# Patient Record
Sex: FEMALE | Race: WHITE | NOT HISPANIC OR LATINO
[De-identification: names, ages, dates, MRNs, and addresses within clinical notes are randomized per-mention and may not be internally consistent; named-entity substitution may affect disease eponyms.]

---

## 2017-01-11 ENCOUNTER — APPOINTMENT (OUTPATIENT)
Dept: INTERNAL MEDICINE | Facility: CLINIC | Age: 72
End: 2017-01-11

## 2017-01-11 VITALS
OXYGEN SATURATION: 94 % | TEMPERATURE: 97.6 F | HEART RATE: 85 BPM | RESPIRATION RATE: 13 BRPM | HEIGHT: 68 IN | DIASTOLIC BLOOD PRESSURE: 79 MMHG | SYSTOLIC BLOOD PRESSURE: 131 MMHG | BODY MASS INDEX: 31.07 KG/M2 | WEIGHT: 205 LBS

## 2017-01-11 DIAGNOSIS — J22 UNSPECIFIED ACUTE LOWER RESPIRATORY INFECTION: ICD-10-CM

## 2017-01-11 DIAGNOSIS — J44.1 CHRONIC OBSTRUCTIVE PULMONARY DISEASE WITH (ACUTE) EXACERBATION: ICD-10-CM

## 2017-05-03 ENCOUNTER — NON-APPOINTMENT (OUTPATIENT)
Age: 72
End: 2017-05-03

## 2017-05-03 ENCOUNTER — APPOINTMENT (OUTPATIENT)
Dept: INTERNAL MEDICINE | Facility: CLINIC | Age: 72
End: 2017-05-03

## 2017-05-03 VITALS
DIASTOLIC BLOOD PRESSURE: 73 MMHG | SYSTOLIC BLOOD PRESSURE: 119 MMHG | BODY MASS INDEX: 31.83 KG/M2 | RESPIRATION RATE: 12 BRPM | TEMPERATURE: 97.7 F | WEIGHT: 210 LBS | HEIGHT: 68 IN | OXYGEN SATURATION: 94 % | HEART RATE: 93 BPM

## 2017-05-03 RX ORDER — CEFUROXIME AXETIL 500 MG/1
500 TABLET ORAL
Qty: 20 | Refills: 0 | Status: DISCONTINUED | COMMUNITY
Start: 2017-01-11 | End: 2017-05-03

## 2017-05-04 LAB
ALBUMIN SERPL ELPH-MCNC: 4.3 G/DL
ALP BLD-CCNC: 61 U/L
ALT SERPL-CCNC: 12 U/L
ANION GAP SERPL CALC-SCNC: 17 MMOL/L
APPEARANCE: CLEAR
AST SERPL-CCNC: 19 U/L
BACTERIA: NEGATIVE
BILIRUB SERPL-MCNC: 0.5 MG/DL
BILIRUBIN URINE: ABNORMAL
BLOOD URINE: NEGATIVE
BUN SERPL-MCNC: 11 MG/DL
CALCIUM SERPL-MCNC: 9.7 MG/DL
CHLORIDE SERPL-SCNC: 97 MMOL/L
CHOLEST SERPL-MCNC: 189 MG/DL
CHOLEST/HDLC SERPL: 4.1 RATIO
CO2 SERPL-SCNC: 25 MMOL/L
COLOR: ABNORMAL
CREAT SERPL-MCNC: 1.06 MG/DL
CREAT SPEC-SCNC: 242 MG/DL
FERRITIN SERPL-MCNC: 71.7 NG/ML
FOLATE SERPL-MCNC: 12.4 NG/ML
GLUCOSE QUALITATIVE U: NORMAL MG/DL
GLUCOSE SERPL-MCNC: 92 MG/DL
HBA1C MFR BLD HPLC: 5.8 %
HDLC SERPL-MCNC: 46 MG/DL
HYALINE CASTS: 0 /LPF
KETONES URINE: ABNORMAL
LDLC SERPL CALC-MCNC: 112 MG/DL
LEUKOCYTE ESTERASE URINE: NEGATIVE
MICROALBUMIN 24H UR DL<=1MG/L-MCNC: 1.3 MG/DL
MICROALBUMIN/CREAT 24H UR-RTO: 5 UG/MG
MICROSCOPIC-UA: NORMAL
NITRITE URINE: NEGATIVE
PH URINE: 7
POTASSIUM SERPL-SCNC: 3.9 MMOL/L
PROT SERPL-MCNC: 7 G/DL
PROTEIN URINE: NEGATIVE MG/DL
RED BLOOD CELLS URINE: 2 /HPF
SODIUM SERPL-SCNC: 139 MMOL/L
SPECIFIC GRAVITY URINE: 1.02
SQUAMOUS EPITHELIAL CELLS: 3 /HPF
TRIGL SERPL-MCNC: 153 MG/DL
TSH SERPL-ACNC: 1.46 UIU/ML
URINE COMMENTS: NORMAL
UROBILINOGEN URINE: 1 MG/DL
VIT B12 SERPL-MCNC: 276 PG/ML
WHITE BLOOD CELLS URINE: 2 /HPF

## 2017-05-05 LAB
BASOPHILS # BLD AUTO: 0.04 K/UL
BASOPHILS NFR BLD AUTO: 0.7 %
EOSINOPHIL # BLD AUTO: 0.07 K/UL
EOSINOPHIL NFR BLD AUTO: 1.3 %
HCT VFR BLD CALC: 41.9 %
HGB BLD-MCNC: 13.9 G/DL
IMM GRANULOCYTES NFR BLD AUTO: 0.2 %
LYMPHOCYTES # BLD AUTO: 2.31 K/UL
LYMPHOCYTES NFR BLD AUTO: 42.2 %
MAN DIFF?: NORMAL
MCHC RBC-ENTMCNC: 28.8 PG
MCHC RBC-ENTMCNC: 33.2 GM/DL
MCV RBC AUTO: 86.7 FL
MONOCYTES # BLD AUTO: 0.52 K/UL
MONOCYTES NFR BLD AUTO: 9.5 %
NEUTROPHILS # BLD AUTO: 2.53 K/UL
NEUTROPHILS NFR BLD AUTO: 46.1 %
PLATELET # BLD AUTO: 204 K/UL
RBC # BLD: 4.83 M/UL
RBC # FLD: 13.6 %
WBC # FLD AUTO: 5.48 K/UL

## 2017-06-30 DIAGNOSIS — R92.2 INCONCLUSIVE MAMMOGRAM: ICD-10-CM

## 2017-08-14 ENCOUNTER — APPOINTMENT (OUTPATIENT)
Dept: INTERNAL MEDICINE | Facility: CLINIC | Age: 72
End: 2017-08-14
Payer: MEDICARE

## 2017-08-14 VITALS
OXYGEN SATURATION: 97 % | HEART RATE: 80 BPM | HEIGHT: 68 IN | BODY MASS INDEX: 31.37 KG/M2 | DIASTOLIC BLOOD PRESSURE: 73 MMHG | TEMPERATURE: 98.2 F | RESPIRATION RATE: 12 BRPM | SYSTOLIC BLOOD PRESSURE: 123 MMHG | WEIGHT: 207 LBS

## 2017-08-14 DIAGNOSIS — R20.8 OTHER DISTURBANCES OF SKIN SENSATION: ICD-10-CM

## 2017-08-14 DIAGNOSIS — R92.8 OTHER ABNORMAL AND INCONCLUSIVE FINDINGS ON DIAGNOSTIC IMAGING OF BREAST: ICD-10-CM

## 2017-08-14 DIAGNOSIS — R73.03 PREDIABETES.: ICD-10-CM

## 2017-08-14 PROCEDURE — 99214 OFFICE O/P EST MOD 30 MIN: CPT

## 2017-08-14 RX ORDER — FAMOTIDINE 20 MG/1
20 TABLET, FILM COATED ORAL
Qty: 15 | Refills: 0 | Status: DISCONTINUED | COMMUNITY
Start: 2017-01-11 | End: 2017-08-14

## 2017-08-15 LAB
25(OH)D3 SERPL-MCNC: 30.7 NG/ML
ANION GAP SERPL CALC-SCNC: 14 MMOL/L
APPEARANCE: CLEAR
BACTERIA: ABNORMAL
BILIRUBIN URINE: ABNORMAL
BLOOD URINE: NEGATIVE
BUN SERPL-MCNC: 10 MG/DL
CALCIUM SERPL-MCNC: 8.9 MG/DL
CHLORIDE SERPL-SCNC: 102 MMOL/L
CO2 SERPL-SCNC: 27 MMOL/L
COLOR: ABNORMAL
CREAT SERPL-MCNC: 0.87 MG/DL
CREAT SPEC-SCNC: 249 MG/DL
FERRITIN SERPL-MCNC: 59 NG/ML
FOLATE SERPL-MCNC: 7.4 NG/ML
GLUCOSE QUALITATIVE U: NORMAL MG/DL
GLUCOSE SERPL-MCNC: 108 MG/DL
HBA1C MFR BLD HPLC: 5.5 %
HYALINE CASTS: 9 /LPF
KETONES URINE: ABNORMAL
LEUKOCYTE ESTERASE URINE: NEGATIVE
MICROALBUMIN 24H UR DL<=1MG/L-MCNC: 3.2 MG/DL
MICROALBUMIN/CREAT 24H UR-RTO: 13 MG/G
MICROSCOPIC-UA: NORMAL
NITRITE URINE: NEGATIVE
PH URINE: 7.5
POTASSIUM SERPL-SCNC: 3.9 MMOL/L
PROTEIN URINE: ABNORMAL MG/DL
RED BLOOD CELLS URINE: 4 /HPF
SODIUM SERPL-SCNC: 143 MMOL/L
SPECIFIC GRAVITY URINE: 1.02
SQUAMOUS EPITHELIAL CELLS: 2 /HPF
TSH SERPL-ACNC: 1.57 UIU/ML
URATE SERPL-MCNC: 6.3 MG/DL
UROBILINOGEN URINE: 1 MG/DL
VIT B12 SERPL-MCNC: 269 PG/ML
WHITE BLOOD CELLS URINE: 3 /HPF

## 2017-08-16 LAB
BASOPHILS # BLD AUTO: 0.03 K/UL
BASOPHILS NFR BLD AUTO: 0.6 %
EOSINOPHIL # BLD AUTO: 0.06 K/UL
EOSINOPHIL NFR BLD AUTO: 1.1 %
HCT VFR BLD CALC: 43.9 %
HGB BLD-MCNC: 14 G/DL
IMM GRANULOCYTES NFR BLD AUTO: 0.2 %
LYMPHOCYTES # BLD AUTO: 1.99 K/UL
LYMPHOCYTES NFR BLD AUTO: 36.8 %
MAN DIFF?: NORMAL
MCHC RBC-ENTMCNC: 28.6 PG
MCHC RBC-ENTMCNC: 31.9 GM/DL
MCV RBC AUTO: 89.8 FL
MONOCYTES # BLD AUTO: 0.45 K/UL
MONOCYTES NFR BLD AUTO: 8.3 %
NEUTROPHILS # BLD AUTO: 2.87 K/UL
NEUTROPHILS NFR BLD AUTO: 53 %
PLATELET # BLD AUTO: 198 K/UL
RBC # BLD: 4.89 M/UL
RBC # FLD: 14.7 %
WBC # FLD AUTO: 5.41 K/UL

## 2018-05-04 ENCOUNTER — NON-APPOINTMENT (OUTPATIENT)
Age: 73
End: 2018-05-04

## 2018-05-04 ENCOUNTER — LABORATORY RESULT (OUTPATIENT)
Age: 73
End: 2018-05-04

## 2018-05-04 ENCOUNTER — APPOINTMENT (OUTPATIENT)
Dept: INTERNAL MEDICINE | Facility: CLINIC | Age: 73
End: 2018-05-04
Payer: MEDICARE

## 2018-05-04 VITALS
HEIGHT: 68 IN | WEIGHT: 203 LBS | BODY MASS INDEX: 30.77 KG/M2 | HEART RATE: 86 BPM | TEMPERATURE: 98.8 F | SYSTOLIC BLOOD PRESSURE: 135 MMHG | DIASTOLIC BLOOD PRESSURE: 71 MMHG | RESPIRATION RATE: 12 BRPM | OXYGEN SATURATION: 93 %

## 2018-05-04 DIAGNOSIS — M16.10 UNILATERAL PRIMARY OSTEOARTHRITIS, UNSPECIFIED HIP: ICD-10-CM

## 2018-05-04 PROCEDURE — 93000 ELECTROCARDIOGRAM COMPLETE: CPT

## 2018-05-04 PROCEDURE — 99213 OFFICE O/P EST LOW 20 MIN: CPT

## 2018-05-04 RX ORDER — PROMETHAZINE HYDROCHLORIDE AND DEXTROMETHORPHAN HYDROBROMIDE ORAL SOLUTION 15; 6.25 MG/5ML; MG/5ML
6.25-15 SOLUTION ORAL
Qty: 60 | Refills: 0 | Status: DISCONTINUED | COMMUNITY
Start: 2018-04-09

## 2018-05-04 RX ORDER — PREDNISONE 20 MG/1
20 TABLET ORAL
Qty: 7 | Refills: 0 | Status: DISCONTINUED | COMMUNITY
Start: 2018-04-09

## 2018-05-04 RX ORDER — BUDESONIDE 90 UG/1
90 AEROSOL, POWDER RESPIRATORY (INHALATION) TWICE DAILY
Qty: 1 | Refills: 3 | Status: DISCONTINUED | COMMUNITY
Start: 2017-01-11 | End: 2018-05-04

## 2018-05-04 RX ORDER — DOXYCYCLINE HYCLATE 100 MG/1
100 TABLET ORAL
Qty: 20 | Refills: 0 | Status: DISCONTINUED | COMMUNITY
Start: 2018-04-09

## 2018-05-04 RX ORDER — FLUTICASONE PROPIONATE 50 UG/1
50 SPRAY, METERED NASAL TWICE DAILY
Qty: 1 | Refills: 0 | Status: DISCONTINUED | COMMUNITY
Start: 2017-01-11 | End: 2018-05-04

## 2018-05-04 RX ORDER — ALBUTEROL SULFATE 90 UG/1
108 (90 BASE) AEROSOL, METERED RESPIRATORY (INHALATION)
Qty: 1 | Refills: 1 | Status: DISCONTINUED | COMMUNITY
Start: 2017-01-11 | End: 2018-05-04

## 2018-05-04 RX ORDER — BENZONATATE 100 MG/1
100 CAPSULE ORAL
Qty: 30 | Refills: 0 | Status: DISCONTINUED | COMMUNITY
Start: 2018-03-31

## 2018-05-07 DIAGNOSIS — N39.0 URINARY TRACT INFECTION, SITE NOT SPECIFIED: ICD-10-CM

## 2018-05-07 LAB
25(OH)D3 SERPL-MCNC: 26.9 NG/ML
ALBUMIN SERPL ELPH-MCNC: 4.1 G/DL
ALP BLD-CCNC: 51 U/L
ALT SERPL-CCNC: 16 U/L
ANION GAP SERPL CALC-SCNC: 20 MMOL/L
APPEARANCE: ABNORMAL
AST SERPL-CCNC: 20 U/L
BASOPHILS # BLD AUTO: 0.04 K/UL
BASOPHILS NFR BLD AUTO: 0.9 %
BILIRUB SERPL-MCNC: 0.6 MG/DL
BILIRUBIN URINE: ABNORMAL
BLOOD URINE: NEGATIVE
BUN SERPL-MCNC: 13 MG/DL
CALCIUM SERPL-MCNC: 9.6 MG/DL
CHLORIDE SERPL-SCNC: 102 MMOL/L
CHOLEST SERPL-MCNC: 217 MG/DL
CHOLEST/HDLC SERPL: 4.1 RATIO
CO2 SERPL-SCNC: 23 MMOL/L
COLOR: ABNORMAL
CREAT SERPL-MCNC: 1.12 MG/DL
CREAT SPEC-SCNC: 571 MG/DL
EOSINOPHIL # BLD AUTO: 0.06 K/UL
EOSINOPHIL NFR BLD AUTO: 1.3 %
FERRITIN SERPL-MCNC: 117 NG/ML
FOLATE SERPL-MCNC: 9.6 NG/ML
GLUCOSE QUALITATIVE U: NEGATIVE MG/DL
GLUCOSE SERPL-MCNC: 116 MG/DL
HBA1C MFR BLD HPLC: 5.7 %
HCT VFR BLD CALC: 39.7 %
HDLC SERPL-MCNC: 53 MG/DL
HGB BLD-MCNC: 13.4 G/DL
IMM GRANULOCYTES NFR BLD AUTO: 0 %
KETONES URINE: ABNORMAL
LDLC SERPL CALC-MCNC: 139 MG/DL
LEUKOCYTE ESTERASE URINE: NEGATIVE
LYMPHOCYTES # BLD AUTO: 1.95 K/UL
LYMPHOCYTES NFR BLD AUTO: 42.8 %
MAN DIFF?: NORMAL
MCHC RBC-ENTMCNC: 29 PG
MCHC RBC-ENTMCNC: 33.8 GM/DL
MCV RBC AUTO: 85.9 FL
MICROALBUMIN 24H UR DL<=1MG/L-MCNC: 8 MG/DL
MICROALBUMIN/CREAT 24H UR-RTO: 14 MG/G
MONOCYTES # BLD AUTO: 0.46 K/UL
MONOCYTES NFR BLD AUTO: 10.1 %
NEUTROPHILS # BLD AUTO: 2.05 K/UL
NEUTROPHILS NFR BLD AUTO: 44.9 %
NITRITE URINE: NEGATIVE
PH URINE: 6
PLATELET # BLD AUTO: 204 K/UL
POTASSIUM SERPL-SCNC: 3.7 MMOL/L
PROT SERPL-MCNC: 6.8 G/DL
PROTEIN URINE: 30 MG/DL
RBC # BLD: 4.62 M/UL
RBC # FLD: 14.4 %
SODIUM SERPL-SCNC: 145 MMOL/L
SPECIFIC GRAVITY URINE: 1.03
TRIGL SERPL-MCNC: 123 MG/DL
TSH SERPL-ACNC: 2.22 UIU/ML
URATE SERPL-MCNC: 7.4 MG/DL
UROBILINOGEN URINE: 1 MG/DL
VIT B12 SERPL-MCNC: 317 PG/ML
WBC # FLD AUTO: 4.56 K/UL

## 2018-06-11 ENCOUNTER — APPOINTMENT (OUTPATIENT)
Dept: INTERNAL MEDICINE | Facility: CLINIC | Age: 73
End: 2018-06-11
Payer: MEDICARE

## 2018-06-11 VITALS
RESPIRATION RATE: 12 BRPM | SYSTOLIC BLOOD PRESSURE: 115 MMHG | WEIGHT: 202 LBS | DIASTOLIC BLOOD PRESSURE: 67 MMHG | HEART RATE: 87 BPM | OXYGEN SATURATION: 95 % | TEMPERATURE: 98 F | HEIGHT: 68 IN | BODY MASS INDEX: 30.62 KG/M2

## 2018-06-11 PROCEDURE — 99214 OFFICE O/P EST MOD 30 MIN: CPT

## 2018-06-11 RX ORDER — SULFAMETHOXAZOLE AND TRIMETHOPRIM 800; 160 MG/1; MG/1
800-160 TABLET ORAL TWICE DAILY
Qty: 10 | Refills: 0 | Status: DISCONTINUED | COMMUNITY
Start: 2018-05-07 | End: 2018-06-11

## 2018-06-11 RX ORDER — SIMVASTATIN 5 MG/1
5 TABLET, FILM COATED ORAL
Qty: 30 | Refills: 3 | Status: COMPLETED | COMMUNITY
Start: 2018-05-07 | End: 2018-06-11

## 2018-06-13 DIAGNOSIS — E87.6 HYPOKALEMIA: ICD-10-CM

## 2018-06-13 LAB
ALBUMIN SERPL ELPH-MCNC: 4.2 G/DL
ALP BLD-CCNC: 48 U/L
ALT SERPL-CCNC: 13 U/L
ANION GAP SERPL CALC-SCNC: 16 MMOL/L
AST SERPL-CCNC: 23 U/L
BACTERIA UR CULT: NORMAL
BILIRUB SERPL-MCNC: 0.7 MG/DL
BUN SERPL-MCNC: 15 MG/DL
CALCIUM SERPL-MCNC: 9.6 MG/DL
CHLORIDE SERPL-SCNC: 97 MMOL/L
CHOLEST SERPL-MCNC: 179 MG/DL
CHOLEST/HDLC SERPL: 3.3 RATIO
CO2 SERPL-SCNC: 27 MMOL/L
CREAT SERPL-MCNC: 1.02 MG/DL
GLUCOSE SERPL-MCNC: 90 MG/DL
HDLC SERPL-MCNC: 54 MG/DL
LDLC SERPL CALC-MCNC: 104 MG/DL
POTASSIUM SERPL-SCNC: 3.4 MMOL/L
PROT SERPL-MCNC: 7.6 G/DL
SODIUM SERPL-SCNC: 140 MMOL/L
TRIGL SERPL-MCNC: 103 MG/DL

## 2018-06-18 LAB
APPEARANCE: CLEAR
BACTERIA: NEGATIVE
BILIRUBIN URINE: NEGATIVE
BLOOD URINE: NEGATIVE
COLOR: ABNORMAL
GLUCOSE QUALITATIVE U: NEGATIVE MG/DL
HYALINE CASTS: 4 /LPF
KETONES URINE: NEGATIVE
LEUKOCYTE ESTERASE URINE: NEGATIVE
MICROSCOPIC-UA: NORMAL
NITRITE URINE: NEGATIVE
PH URINE: 7
PROTEIN URINE: NEGATIVE MG/DL
RED BLOOD CELLS URINE: 1 /HPF
SPECIFIC GRAVITY URINE: 1.02
SQUAMOUS EPITHELIAL CELLS: 2 /HPF
UROBILINOGEN URINE: NEGATIVE MG/DL
WHITE BLOOD CELLS URINE: 4 /HPF

## 2018-11-01 ENCOUNTER — APPOINTMENT (OUTPATIENT)
Dept: INTERNAL MEDICINE | Facility: CLINIC | Age: 73
End: 2018-11-01
Payer: MEDICARE

## 2018-11-01 VITALS
HEIGHT: 68 IN | WEIGHT: 199 LBS | DIASTOLIC BLOOD PRESSURE: 72 MMHG | HEART RATE: 75 BPM | RESPIRATION RATE: 12 BRPM | BODY MASS INDEX: 30.16 KG/M2 | TEMPERATURE: 98.5 F | OXYGEN SATURATION: 95 % | SYSTOLIC BLOOD PRESSURE: 121 MMHG

## 2018-11-01 DIAGNOSIS — J44.9 CHRONIC OBSTRUCTIVE PULMONARY DISEASE, UNSPECIFIED: ICD-10-CM

## 2018-11-01 PROCEDURE — 99214 OFFICE O/P EST MOD 30 MIN: CPT

## 2018-11-01 NOTE — REVIEW OF SYSTEMS
[Joint Pain] : joint pain [Joint Stiffness] : joint stiffness [Unsteady Walking] : ataxia [Negative] : Heme/Lymph [Joint Swelling] : no joint swelling [Muscle Pain] : no muscle pain [Back Pain] : no back pain [Headache] : no headache [Dizziness] : no dizziness [Confusion] : no confusion [Memory Loss] : no memory loss

## 2018-11-01 NOTE — PHYSICAL EXAM

## 2019-07-19 ENCOUNTER — LABORATORY RESULT (OUTPATIENT)
Age: 74
End: 2019-07-19

## 2019-07-19 ENCOUNTER — APPOINTMENT (OUTPATIENT)
Dept: INTERNAL MEDICINE | Facility: CLINIC | Age: 74
End: 2019-07-19
Payer: MEDICARE

## 2019-07-19 ENCOUNTER — NON-APPOINTMENT (OUTPATIENT)
Age: 74
End: 2019-07-19

## 2019-07-19 VITALS
HEART RATE: 96 BPM | OXYGEN SATURATION: 99 % | HEIGHT: 68 IN | DIASTOLIC BLOOD PRESSURE: 90 MMHG | TEMPERATURE: 98.4 F | WEIGHT: 200 LBS | BODY MASS INDEX: 30.31 KG/M2 | SYSTOLIC BLOOD PRESSURE: 132 MMHG | RESPIRATION RATE: 12 BRPM

## 2019-07-19 VITALS — DIASTOLIC BLOOD PRESSURE: 80 MMHG | SYSTOLIC BLOOD PRESSURE: 125 MMHG

## 2019-07-19 DIAGNOSIS — J44.9 CHRONIC OBSTRUCTIVE PULMONARY DISEASE, UNSPECIFIED: ICD-10-CM

## 2019-07-19 PROCEDURE — 93000 ELECTROCARDIOGRAM COMPLETE: CPT

## 2019-07-19 PROCEDURE — G0439: CPT | Mod: 25

## 2019-07-19 NOTE — REVIEW OF SYSTEMS
[Joint Pain] : joint pain [Joint Stiffness] : joint stiffness [Negative] : Heme/Lymph [Joint Swelling] : no joint swelling [Muscle Pain] : no muscle pain [Back Pain] : no back pain [FreeTextEntry9] : LEFT HIP

## 2019-07-19 NOTE — COUNSELING
[Weight management counseling provided] : Weight management [Healthy eating counseling provided] : healthy eating [Activity counseling provided] : activity [Fall prevention counseling provided] : fall prevention

## 2019-07-19 NOTE — PHYSICAL EXAM
[No Acute Distress] : no acute distress [Well Nourished] : well nourished [Well Developed] : well developed [Well-Appearing] : well-appearing [Normal Sclera/Conjunctiva] : normal sclera/conjunctiva [PERRL] : pupils equal round and reactive to light [EOMI] : extraocular movements intact [Normal Outer Ear/Nose] : the outer ears and nose were normal in appearance [Normal Oropharynx] : the oropharynx was normal [No JVD] : no jugular venous distention [No Lymphadenopathy] : no lymphadenopathy [Supple] : supple [Thyroid Normal, No Nodules] : the thyroid was normal and there were no nodules present [No Respiratory Distress] : no respiratory distress  [No Accessory Muscle Use] : no accessory muscle use [Clear to Auscultation] : lungs were clear to auscultation bilaterally [Normal Rate] : normal rate  [Regular Rhythm] : with a regular rhythm [Normal S1, S2] : normal S1 and S2 [No Murmur] : no murmur heard [No Carotid Bruits] : no carotid bruits [No Abdominal Bruit] : a ~M bruit was not heard ~T in the abdomen [No Varicosities] : no varicosities [Pedal Pulses Present] : the pedal pulses are present [No Edema] : there was no peripheral edema [No Palpable Aorta] : no palpable aorta [No Extremity Clubbing/Cyanosis] : no extremity clubbing/cyanosis [Normal Appearance] : normal in appearance [No Nipple Discharge] : no nipple discharge [No Axillary Lymphadenopathy] : no axillary lymphadenopathy [Soft] : abdomen soft [Non Tender] : non-tender [Non-distended] : non-distended [No Masses] : no abdominal mass palpated [No HSM] : no HSM [Normal Bowel Sounds] : normal bowel sounds [Normal Posterior Cervical Nodes] : no posterior cervical lymphadenopathy [Normal Anterior Cervical Nodes] : no anterior cervical lymphadenopathy [No CVA Tenderness] : no CVA  tenderness [No Spinal Tenderness] : no spinal tenderness [No Joint Swelling] : no joint swelling [Grossly Normal Strength/Tone] : grossly normal strength/tone [No Rash] : no rash [Coordination Grossly Intact] : coordination grossly intact [No Focal Deficits] : no focal deficits [Normal Gait] : normal gait [Deep Tendon Reflexes (DTR)] : deep tendon reflexes were 2+ and symmetric [Normal Affect] : the affect was normal [Normal Insight/Judgement] : insight and judgment were intact [de-identified] : LIMITED ROM OF LEFT HIP

## 2019-07-22 LAB
25(OH)D3 SERPL-MCNC: 29.5 NG/ML
ALBUMIN SERPL ELPH-MCNC: 4 G/DL
ALP BLD-CCNC: 57 U/L
ALT SERPL-CCNC: 11 U/L
ANION GAP SERPL CALC-SCNC: 13 MMOL/L
APPEARANCE: CLEAR
AST SERPL-CCNC: 10 U/L
BASOPHILS # BLD AUTO: 0.05 K/UL
BASOPHILS NFR BLD AUTO: 1 %
BILIRUB SERPL-MCNC: 0.7 MG/DL
BILIRUBIN URINE: NEGATIVE
BLOOD URINE: NEGATIVE
BUN SERPL-MCNC: 12 MG/DL
CALCIUM SERPL-MCNC: 9.2 MG/DL
CHLORIDE SERPL-SCNC: 100 MMOL/L
CO2 SERPL-SCNC: 26 MMOL/L
COLOR: YELLOW
CREAT SERPL-MCNC: 0.96 MG/DL
CREAT SPEC-SCNC: 243 MG/DL
EOSINOPHIL # BLD AUTO: 0.07 K/UL
EOSINOPHIL NFR BLD AUTO: 1.5 %
ESTIMATED AVERAGE GLUCOSE: 108 MG/DL
FERRITIN SERPL-MCNC: 80 NG/ML
FOLATE SERPL-MCNC: 7.1 NG/ML
GLUCOSE QUALITATIVE U: NEGATIVE
GLUCOSE SERPL-MCNC: 101 MG/DL
GLUCOSE SERPL-MCNC: 101 MG/DL
HBA1C MFR BLD HPLC: 5.4 %
HCT VFR BLD CALC: 41.5 %
HGB BLD-MCNC: 13.5 G/DL
IMM GRANULOCYTES NFR BLD AUTO: 0.2 %
KETONES URINE: NEGATIVE
LEUKOCYTE ESTERASE URINE: NEGATIVE
LYMPHOCYTES # BLD AUTO: 1.88 K/UL
LYMPHOCYTES NFR BLD AUTO: 39.2 %
MAN DIFF?: NORMAL
MCHC RBC-ENTMCNC: 28.1 PG
MCHC RBC-ENTMCNC: 32.5 GM/DL
MCV RBC AUTO: 86.5 FL
MICROALBUMIN 24H UR DL<=1MG/L-MCNC: 1.3 MG/DL
MICROALBUMIN/CREAT 24H UR-RTO: 5 MG/G
MONOCYTES # BLD AUTO: 0.45 K/UL
MONOCYTES NFR BLD AUTO: 9.4 %
NEUTROPHILS # BLD AUTO: 2.34 K/UL
NEUTROPHILS NFR BLD AUTO: 48.7 %
NITRITE URINE: NEGATIVE
PH URINE: 6.5
PLATELET # BLD AUTO: 173 K/UL
POTASSIUM SERPL-SCNC: 3.6 MMOL/L
PROT SERPL-MCNC: 6.6 G/DL
PROTEIN URINE: ABNORMAL
RBC # BLD: 4.8 M/UL
RBC # FLD: 12.9 %
SODIUM SERPL-SCNC: 139 MMOL/L
SPECIFIC GRAVITY URINE: 1.02
TSH SERPL-ACNC: 2.05 UIU/ML
UROBILINOGEN URINE: NORMAL
VIT B12 SERPL-MCNC: 236 PG/ML
WBC # FLD AUTO: 4.8 K/UL

## 2019-07-24 LAB
CHOLEST SERPL-MCNC: 177 MG/DL
CHOLEST/HDLC SERPL: 4 RATIO
HDLC SERPL-MCNC: 44 MG/DL
LDLC SERPL CALC-MCNC: 112 MG/DL
TRIGL SERPL-MCNC: 107 MG/DL

## 2020-01-17 ENCOUNTER — APPOINTMENT (OUTPATIENT)
Dept: INTERNAL MEDICINE | Facility: CLINIC | Age: 75
End: 2020-01-17
Payer: MEDICARE

## 2020-01-17 VITALS
HEART RATE: 71 BPM | SYSTOLIC BLOOD PRESSURE: 124 MMHG | WEIGHT: 199 LBS | BODY MASS INDEX: 30.16 KG/M2 | TEMPERATURE: 98 F | DIASTOLIC BLOOD PRESSURE: 80 MMHG | HEIGHT: 68 IN | OXYGEN SATURATION: 98 % | RESPIRATION RATE: 12 BRPM

## 2020-01-17 DIAGNOSIS — Z00.00 ENCOUNTER FOR GENERAL ADULT MEDICAL EXAMINATION W/OUT ABNORMAL FINDINGS: ICD-10-CM

## 2020-01-17 PROCEDURE — 90662 IIV NO PRSV INCREASED AG IM: CPT

## 2020-01-17 PROCEDURE — 99213 OFFICE O/P EST LOW 20 MIN: CPT | Mod: 25

## 2020-01-17 PROCEDURE — G0008: CPT

## 2020-01-17 RX ORDER — PNEUMOCOCCAL 13-VALENT CONJUGATE VACCINE 2.2; 2.2; 2.2; 2.2; 2.2; 4.4; 2.2; 2.2; 2.2; 2.2; 2.2; 2.2; 2.2 UG/.5ML; UG/.5ML; UG/.5ML; UG/.5ML; UG/.5ML; UG/.5ML; UG/.5ML; UG/.5ML; UG/.5ML; UG/.5ML; UG/.5ML; UG/.5ML; UG/.5ML
INJECTION, SUSPENSION INTRAMUSCULAR
Qty: 1 | Refills: 0 | Status: DISCONTINUED | COMMUNITY
Start: 2018-11-01 | End: 2020-01-17

## 2020-01-17 RX ORDER — ZOSTER VACCINE RECOMBINANT, ADJUVANTED 50 MCG/0.5
50 KIT INTRAMUSCULAR
Qty: 1 | Refills: 1 | Status: DISCONTINUED | COMMUNITY
Start: 2019-07-19 | End: 2020-01-17

## 2020-01-17 NOTE — PHYSICAL EXAM
[No Acute Distress] : no acute distress [Well Nourished] : well nourished [Well-Appearing] : well-appearing [Well Developed] : well developed [Normal Sclera/Conjunctiva] : normal sclera/conjunctiva [EOMI] : extraocular movements intact [PERRL] : pupils equal round and reactive to light [Normal Outer Ear/Nose] : the outer ears and nose were normal in appearance [No JVD] : no jugular venous distention [Normal Oropharynx] : the oropharynx was normal [Supple] : supple [No Lymphadenopathy] : no lymphadenopathy [Thyroid Normal, No Nodules] : the thyroid was normal and there were no nodules present [No Accessory Muscle Use] : no accessory muscle use [No Respiratory Distress] : no respiratory distress  [Regular Rhythm] : with a regular rhythm [Normal S1, S2] : normal S1 and S2 [Clear to Auscultation] : lungs were clear to auscultation bilaterally [Normal Rate] : normal rate  [No Murmur] : no murmur heard [No Abdominal Bruit] : a ~M bruit was not heard ~T in the abdomen [No Carotid Bruits] : no carotid bruits [No Varicosities] : no varicosities [Pedal Pulses Present] : the pedal pulses are present [No Edema] : there was no peripheral edema [No Palpable Aorta] : no palpable aorta [No Extremity Clubbing/Cyanosis] : no extremity clubbing/cyanosis [Non Tender] : non-tender [Soft] : abdomen soft [Non-distended] : non-distended [No HSM] : no HSM [No Masses] : no abdominal mass palpated [Normal Bowel Sounds] : normal bowel sounds [Normal Anterior Cervical Nodes] : no anterior cervical lymphadenopathy [No CVA Tenderness] : no CVA  tenderness [Normal Posterior Cervical Nodes] : no posterior cervical lymphadenopathy [Grossly Normal Strength/Tone] : grossly normal strength/tone [No Spinal Tenderness] : no spinal tenderness [No Joint Swelling] : no joint swelling [No Focal Deficits] : no focal deficits [No Rash] : no rash [Coordination Grossly Intact] : coordination grossly intact [Normal Affect] : the affect was normal [Normal Gait] : normal gait [Deep Tendon Reflexes (DTR)] : deep tendon reflexes were 2+ and symmetric [Normal Insight/Judgement] : insight and judgment were intact

## 2020-09-08 ENCOUNTER — APPOINTMENT (OUTPATIENT)
Dept: INTERNAL MEDICINE | Facility: CLINIC | Age: 75
End: 2020-09-08

## 2020-10-13 ENCOUNTER — APPOINTMENT (OUTPATIENT)
Dept: INTERNAL MEDICINE | Facility: CLINIC | Age: 75
End: 2020-10-13
Payer: MEDICARE

## 2020-10-13 VITALS
OXYGEN SATURATION: 93 % | DIASTOLIC BLOOD PRESSURE: 71 MMHG | WEIGHT: 176 LBS | HEIGHT: 68 IN | BODY MASS INDEX: 26.67 KG/M2 | HEART RATE: 85 BPM | TEMPERATURE: 97.3 F | SYSTOLIC BLOOD PRESSURE: 119 MMHG

## 2020-10-13 DIAGNOSIS — S72.001A FRACTURE OF UNSPECIFIED PART OF NECK OF RIGHT FEMUR, INITIAL ENCOUNTER FOR CLOSED FRACTURE: ICD-10-CM

## 2020-10-13 DIAGNOSIS — R11.0 NAUSEA: ICD-10-CM

## 2020-10-13 PROCEDURE — 99214 OFFICE O/P EST MOD 30 MIN: CPT

## 2020-10-13 NOTE — PHYSICAL EXAM
[No Acute Distress] : no acute distress [Well Nourished] : well nourished [Well Developed] : well developed [Well-Appearing] : well-appearing [Normal Sclera/Conjunctiva] : normal sclera/conjunctiva [PERRL] : pupils equal round and reactive to light [EOMI] : extraocular movements intact [Normal Outer Ear/Nose] : the outer ears and nose were normal in appearance [Normal Oropharynx] : the oropharynx was normal [No JVD] : no jugular venous distention [No Lymphadenopathy] : no lymphadenopathy [Supple] : supple [Thyroid Normal, No Nodules] : the thyroid was normal and there were no nodules present [No Respiratory Distress] : no respiratory distress  [No Accessory Muscle Use] : no accessory muscle use [Clear to Auscultation] : lungs were clear to auscultation bilaterally [Normal Rate] : normal rate  [Regular Rhythm] : with a regular rhythm [Normal S1, S2] : normal S1 and S2 [No Murmur] : no murmur heard [No Carotid Bruits] : no carotid bruits [No Abdominal Bruit] : a ~M bruit was not heard ~T in the abdomen [No Varicosities] : no varicosities [Pedal Pulses Present] : the pedal pulses are present [No Edema] : there was no peripheral edema [No Palpable Aorta] : no palpable aorta [No Extremity Clubbing/Cyanosis] : no extremity clubbing/cyanosis [Soft] : abdomen soft [Non Tender] : non-tender [Non-distended] : non-distended [No Masses] : no abdominal mass palpated [No HSM] : no HSM [Normal Bowel Sounds] : normal bowel sounds [Normal Posterior Cervical Nodes] : no posterior cervical lymphadenopathy [Normal Anterior Cervical Nodes] : no anterior cervical lymphadenopathy [No CVA Tenderness] : no CVA  tenderness [No Spinal Tenderness] : no spinal tenderness [No Joint Swelling] : no joint swelling [Grossly Normal Strength/Tone] : grossly normal strength/tone [No Rash] : no rash [Coordination Grossly Intact] : coordination grossly intact [No Focal Deficits] : no focal deficits [Normal Gait] : normal gait [Deep Tendon Reflexes (DTR)] : deep tendon reflexes were 2+ and symmetric [Normal Affect] : the affect was normal [Normal Insight/Judgement] : insight and judgment were intact [de-identified] : DECREASE ROM AND TENDER RIGHT HIP, 4/5 MS

## 2020-10-13 NOTE — REVIEW OF SYSTEMS
[Joint Pain] : joint pain [Dizziness] : dizziness [Memory Loss] : memory loss [Unsteady Walking] : ataxia [Negative] : Heme/Lymph [Joint Stiffness] : no joint stiffness [Joint Swelling] : no joint swelling [Muscle Weakness] : no muscle weakness [Muscle Pain] : no muscle pain [Back Pain] : no back pain [Headache] : no headache [Fainting] : no fainting [Confusion] : no confusion [FreeTextEntry9] : RIGHT HIP

## 2020-10-21 DIAGNOSIS — I63.312 CEREBRAL INFARCTION DUE TO THROMBOSIS OF LEFT MIDDLE CEREBRAL ARTERY: ICD-10-CM

## 2020-11-02 ENCOUNTER — APPOINTMENT (OUTPATIENT)
Dept: CARDIOLOGY | Facility: CLINIC | Age: 75
End: 2020-11-02
Payer: MEDICARE

## 2020-11-02 ENCOUNTER — NON-APPOINTMENT (OUTPATIENT)
Age: 75
End: 2020-11-02

## 2020-11-02 ENCOUNTER — APPOINTMENT (OUTPATIENT)
Dept: INTERNAL MEDICINE | Facility: CLINIC | Age: 75
End: 2020-11-02
Payer: MEDICARE

## 2020-11-02 VITALS
BODY MASS INDEX: 26.22 KG/M2 | TEMPERATURE: 96.8 F | HEART RATE: 78 BPM | OXYGEN SATURATION: 95 % | HEIGHT: 68 IN | WEIGHT: 173 LBS | DIASTOLIC BLOOD PRESSURE: 72 MMHG | SYSTOLIC BLOOD PRESSURE: 127 MMHG | RESPIRATION RATE: 12 BRPM

## 2020-11-02 DIAGNOSIS — S32.9XXA FRACTURE OF UNSPECIFIED PARTS OF LUMBOSACRAL SPINE AND PELVIS, INITIAL ENCOUNTER FOR CLOSED FRACTURE: ICD-10-CM

## 2020-11-02 DIAGNOSIS — Z23 ENCOUNTER FOR IMMUNIZATION: ICD-10-CM

## 2020-11-02 PROCEDURE — 99204 OFFICE O/P NEW MOD 45 MIN: CPT

## 2020-11-02 PROCEDURE — 93306 TTE W/DOPPLER COMPLETE: CPT

## 2020-11-02 PROCEDURE — 99072 ADDL SUPL MATRL&STAF TM PHE: CPT

## 2020-11-02 PROCEDURE — 99214 OFFICE O/P EST MOD 30 MIN: CPT | Mod: 25

## 2020-11-02 PROCEDURE — 90686 IIV4 VACC NO PRSV 0.5 ML IM: CPT

## 2020-11-02 PROCEDURE — G0008: CPT

## 2020-11-02 RX ORDER — CALCIUM CARBONATE/VITAMIN D3 600 MG-10
600-400 TABLET ORAL
Qty: 30 | Refills: 0 | Status: DISCONTINUED | COMMUNITY
Start: 2020-09-25

## 2020-11-02 RX ORDER — CHLORHEXIDINE GLUCONATE 4 %
325 (65 FE) LIQUID (ML) TOPICAL
Qty: 30 | Refills: 0 | Status: DISCONTINUED | COMMUNITY
Start: 2020-09-25

## 2020-11-02 RX ORDER — LEVETIRACETAM 500 MG/1
500 TABLET, FILM COATED ORAL
Qty: 60 | Refills: 0 | Status: DISCONTINUED | COMMUNITY
Start: 2020-09-25

## 2020-11-02 RX ORDER — CHROMIUM 200 MCG
25 MCG TABLET ORAL
Qty: 30 | Refills: 0 | Status: DISCONTINUED | COMMUNITY
Start: 2020-09-25

## 2020-11-02 RX ORDER — MELOXICAM 7.5 MG/1
7.5 TABLET ORAL
Qty: 40 | Refills: 0 | Status: DISCONTINUED | COMMUNITY
Start: 2020-08-28

## 2020-11-02 NOTE — REVIEW OF SYSTEMS
[Joint Pain] : joint pain [Dizziness] : dizziness [Memory Loss] : memory loss [Unsteady Walking] : ataxia [Negative] : Heme/Lymph [Joint Stiffness] : no joint stiffness [Joint Swelling] : no joint swelling [Muscle Weakness] : no muscle weakness [Muscle Pain] : no muscle pain [Back Pain] : no back pain [Headache] : no headache [Fainting] : no fainting [Confusion] : no confusion [FreeTextEntry9] : RIGHT HIP RESOLVED

## 2020-11-02 NOTE — HISTORY OF PRESENT ILLNESS
[FreeTextEntry1] : Vero is a 75-year-old female htn, s/p syncope with brain hemorrhage who presents for evaluation. She was found in the driveway with dry blood in her hair by a neighbor. She has not recollection of the event or the hospitalization. She was found to have a significant subdural hemorrhage and was in ICU for 10 days [facility] and subsequent rehab upon discharge. Now in a wheelchair. Denies any chest pain, palpitations, lightheadedness or dizziness.

## 2020-11-02 NOTE — PHYSICAL EXAM
[No Acute Distress] : no acute distress [Well Nourished] : well nourished [Well Developed] : well developed [Well-Appearing] : well-appearing [Normal Sclera/Conjunctiva] : normal sclera/conjunctiva [PERRL] : pupils equal round and reactive to light [EOMI] : extraocular movements intact [Normal Outer Ear/Nose] : the outer ears and nose were normal in appearance [Normal Oropharynx] : the oropharynx was normal [No JVD] : no jugular venous distention [No Lymphadenopathy] : no lymphadenopathy [Supple] : supple [Thyroid Normal, No Nodules] : the thyroid was normal and there were no nodules present [No Respiratory Distress] : no respiratory distress  [No Accessory Muscle Use] : no accessory muscle use [Clear to Auscultation] : lungs were clear to auscultation bilaterally [Normal Rate] : normal rate  [Regular Rhythm] : with a regular rhythm [Normal S1, S2] : normal S1 and S2 [No Murmur] : no murmur heard [No Carotid Bruits] : no carotid bruits [No Abdominal Bruit] : a ~M bruit was not heard ~T in the abdomen [No Varicosities] : no varicosities [Pedal Pulses Present] : the pedal pulses are present [No Edema] : there was no peripheral edema [No Palpable Aorta] : no palpable aorta [No Extremity Clubbing/Cyanosis] : no extremity clubbing/cyanosis [Soft] : abdomen soft [Non Tender] : non-tender [Non-distended] : non-distended [No Masses] : no abdominal mass palpated [No HSM] : no HSM [Normal Bowel Sounds] : normal bowel sounds [Normal Posterior Cervical Nodes] : no posterior cervical lymphadenopathy [Normal Anterior Cervical Nodes] : no anterior cervical lymphadenopathy [No CVA Tenderness] : no CVA  tenderness [No Spinal Tenderness] : no spinal tenderness [No Joint Swelling] : no joint swelling [Grossly Normal Strength/Tone] : grossly normal strength/tone [No Rash] : no rash [Coordination Grossly Intact] : coordination grossly intact [No Focal Deficits] : no focal deficits [Normal Affect] : the affect was normal [Alert and Oriented x3] : oriented to person, place, and time [Normal Insight/Judgement] : insight and judgment were intact

## 2020-11-02 NOTE — DISCUSSION/SUMMARY
[FreeTextEntry1] : The patient is a 75-year-old female ex smoker, hypertension s/p syncope with subdural hemorrhage who is now stable with no recollection of events or hospital stay. \par #1 CV- was on cardiac monitor in ICU at [facility] with no comment on arrhythmia, ECHO ordered\par #2 Carotid- doppler neg\par #3 Htn- on amlodipine and HCTZ, no change\par #4 Subdural hemorrhage- stable\par #5 Right hip pain- f/u ortho

## 2020-11-02 NOTE — REVIEW OF SYSTEMS
[Shortness Of Breath] : no shortness of breath [Dyspnea on exertion] : not dyspnea during exertion [Chest Pain] : no chest pain [Lower Ext Edema] : no extremity edema [Palpitations] : no palpitations

## 2020-11-04 LAB
ALBUMIN SERPL ELPH-MCNC: 3.8 G/DL
ALP BLD-CCNC: 87 U/L
ALT SERPL-CCNC: 15 U/L
ANION GAP SERPL CALC-SCNC: 12 MMOL/L
AST SERPL-CCNC: 23 U/L
BASOPHILS # BLD AUTO: 0.04 K/UL
BASOPHILS NFR BLD AUTO: 0.7 %
BILIRUB SERPL-MCNC: 0.8 MG/DL
BUN SERPL-MCNC: 10 MG/DL
CALCIUM SERPL-MCNC: 9.5 MG/DL
CHLORIDE SERPL-SCNC: 101 MMOL/L
CHOLEST SERPL-MCNC: 206 MG/DL
CO2 SERPL-SCNC: 28 MMOL/L
CREAT SERPL-MCNC: 0.86 MG/DL
EOSINOPHIL # BLD AUTO: 0.03 K/UL
EOSINOPHIL NFR BLD AUTO: 0.6 %
ESTIMATED AVERAGE GLUCOSE: 91 MG/DL
FERRITIN SERPL-MCNC: 195 NG/ML
FOLATE SERPL-MCNC: 5.1 NG/ML
GLUCOSE SERPL-MCNC: 96 MG/DL
GLUCOSE SERPL-MCNC: 97 MG/DL
HBA1C MFR BLD HPLC: 4.8 %
HCT VFR BLD CALC: 41.1 %
HDLC SERPL-MCNC: 48 MG/DL
HGB BLD-MCNC: 13 G/DL
IMM GRANULOCYTES NFR BLD AUTO: 0.2 %
IRON SERPL-MCNC: 57 UG/DL
LDLC SERPL CALC-MCNC: 134 MG/DL
LYMPHOCYTES # BLD AUTO: 2.1 K/UL
LYMPHOCYTES NFR BLD AUTO: 39.2 %
MAN DIFF?: NORMAL
MCHC RBC-ENTMCNC: 28.3 PG
MCHC RBC-ENTMCNC: 31.6 GM/DL
MCV RBC AUTO: 89.5 FL
MONOCYTES # BLD AUTO: 0.42 K/UL
MONOCYTES NFR BLD AUTO: 7.8 %
NEUTROPHILS # BLD AUTO: 2.76 K/UL
NEUTROPHILS NFR BLD AUTO: 51.5 %
NONHDLC SERPL-MCNC: 159 MG/DL
PLATELET # BLD AUTO: 200 K/UL
POTASSIUM SERPL-SCNC: 3.1 MMOL/L
PROT SERPL-MCNC: 6.3 G/DL
RBC # BLD: 4.59 M/UL
RBC # FLD: 14.3 %
SODIUM SERPL-SCNC: 141 MMOL/L
TRIGL SERPL-MCNC: 124 MG/DL
WBC # FLD AUTO: 5.36 K/UL

## 2020-11-05 ENCOUNTER — APPOINTMENT (OUTPATIENT)
Dept: ORTHOPEDIC SURGERY | Facility: CLINIC | Age: 75
End: 2020-11-05
Payer: MEDICARE

## 2020-11-05 DIAGNOSIS — S32.591A OTHER SPECIFIED FRACTURE OF RIGHT PUBIS, INITIAL ENCOUNTER FOR CLOSED FRACTURE: ICD-10-CM

## 2020-11-05 PROCEDURE — 99072 ADDL SUPL MATRL&STAF TM PHE: CPT

## 2020-11-05 PROCEDURE — 99203 OFFICE O/P NEW LOW 30 MIN: CPT

## 2020-11-13 PROBLEM — S32.591A CLOSED FRACTURE OF RAMUS OF RIGHT PUBIS, INITIAL ENCOUNTER: Status: ACTIVE | Noted: 2020-11-13

## 2020-11-13 NOTE — DISCUSSION/SUMMARY
[de-identified] : 76 y/o female with right pubic rami fracture\par \par CT shows no evidence for periprosthetic fracture, however it does show a healing right superior and inferior pubic ramus fracture.  This was discussed in detail with the patient and family member present regarding subsequent treatment.  Given union of bone, symptoms are improving.  Discussion was had with the patient regarding weightbearing as tolerated, gait training, and avoidance of falls.  \par \par Recommendation: Continue PT. Conservative care & observation, this includes rest/activity avoidance until less symptomatic with subsequent gradual return to full activity as tolerated. Patient may also use OTC NSAID's or acetaminophen as tolerated, with application of ice to the area 2-3x daily for 20 minutes after periods of activity. \par \par Follow up as needed.

## 2020-11-13 NOTE — CONSULT LETTER
[Dear  ___] : Dear  [unfilled], [Consult Letter:] : I had the pleasure of evaluating your patient, [unfilled]. [Please see my note below.] : Please see my note below. [Consult Closing:] : Thank you very much for allowing me to participate in the care of this patient.  If you have any questions, please do not hesitate to contact me. [Sincerely,] : Sincerely, [FreeTextEntry3] : Jaswinder Zepeda MD\par ______________________________________________\par [facility]: Sports Medicine\par [address], [address], [place] [address]\par (t) [phone]\par (f) [phone]

## 2020-11-13 NOTE — PHYSICAL EXAM
[de-identified] : Oriented to time, place, person\par Mood: Normal\par Affect: Normal\par Appearance: Healthy, well appearing, no acute distress.\par Gait: Mildly antalgic\par Assistive Devices: None \par \par Right Hip Exam:\par \par Skin: Clean, dry, intact\par Inspection: No obvious deformity, no swelling.\par Pulses: 2+ DP/PT pulses \par ROM: 100 flexion. Internal rotation to 45. External rotation to 20. \par Painful ROM: Min, + groin pain.\par Tenderness: No tenderness over greater trochanter. + tenderness at gluteal insertion. No paraspinal tenderness. No axial lumbar tenderness. No sacroiliac tenderness. No TTP over the ASIS/Iliac crest.\par Strength: 5/5 hip flexion, 5/5 gluteal strength, 5/5 hip ADD, 5/5 hip ABD, 5/5 Q/H, 5/5 TA/GS/EHL\par Neuro: Sensation intact to light touch throughout, DTR normal\par Additional tests: Negative impingement test, Negative JOSE  [de-identified] : \par Images were reviewed from [facility] dated 10/7/2020.\par \par  2 views of right pelvic showed no evidence of bony injury, or kelli dislocation. There is no underlying degenerative arthritic change seen. Overall alignment is maintained. Otherwise unremarkable.  \par \par CT of right hip 10/19/2020 shows arthroplasty without evidence for periprosthetic fracture. Healing right superior and inferior pubic ramus fractures.

## 2020-11-13 NOTE — ADDENDUM
[FreeTextEntry1] : This note was written by Candice Tsai on 11/05/2020 acting solely as a scribe for Dr. Jaswinder Zepeda.\par \par All medical record entries made by the Scribe were at my, Dr. Jaswinder Zepeda, direction and personally dictated by me on 11/05/2020. I have personally reviewed the chart and agree that the record accurately reflects my personal performance of the history, physical exam, assessment and plan.

## 2020-11-13 NOTE — HISTORY OF PRESENT ILLNESS
[de-identified] : 75 year old female presents today with right groin pain since September 2020. She sustained a fall, presumed to be MI, x-rays completed couple of weeks after the fall were inconclusive for fx, CT scan was obtained by PMD showed acute ramus fracture. Prior to CT scan she was received PT but has stopped 2 weeks ago. States that she has pain when sleeping but able to walk without pain. Ambulating via walker. \par \par The patient's past medical history, past surgical history, medications and allergies were reviewed by me today with the patient and documented accordingly. In addition, the patient's family and social history, which were noncontributory to this visit, were reviewed also.

## 2020-12-16 PROBLEM — N39.0 ACUTE UTI (URINARY TRACT INFECTION): Status: RESOLVED | Noted: 2018-05-07 | Resolved: 2020-12-16

## 2021-01-26 ENCOUNTER — APPOINTMENT (OUTPATIENT)
Dept: NEUROLOGY | Facility: CLINIC | Age: 76
End: 2021-01-26
Payer: MEDICARE

## 2021-01-26 VITALS
HEART RATE: 69 BPM | SYSTOLIC BLOOD PRESSURE: 120 MMHG | WEIGHT: 178 LBS | HEIGHT: 68 IN | BODY MASS INDEX: 26.98 KG/M2 | DIASTOLIC BLOOD PRESSURE: 72 MMHG

## 2021-01-26 VITALS — TEMPERATURE: 97.2 F

## 2021-01-26 DIAGNOSIS — G93.89 OTHER SPECIFIED DISORDERS OF BRAIN: ICD-10-CM

## 2021-01-26 PROCEDURE — 99205 OFFICE O/P NEW HI 60 MIN: CPT

## 2021-01-26 PROCEDURE — 99072 ADDL SUPL MATRL&STAF TM PHE: CPT

## 2021-01-26 RX ORDER — FAMOTIDINE 10 MG/1
10 TABLET, FILM COATED ORAL
Qty: 40 | Refills: 3 | Status: DISCONTINUED | COMMUNITY
Start: 2020-10-13 | End: 2021-01-26

## 2021-01-26 NOTE — REASON FOR VISIT
[Initial Evaluation] : an initial evaluation [Family Member] : family member [FreeTextEntry1] : Memory loss

## 2021-01-26 NOTE — HISTORY OF PRESENT ILLNESS
[FreeTextEntry1] : HPI: 74yo LH WW, with HNT, HLD, s/p fall in 9/2020 with L temporal SDH and brain contusion, unclear if stroke, here for memory loss. \par \par PMH:\par in 9/2020 she was found on the floor in her garden, unconscious, with blood by her head. Taken to [facility], she was dx with SDH and brain contusion. unclear if stroke.\par Dc to rehab for 2 weeks thereafter.\par CTH in 10/2020 showe L temporal encephalomalacia, unclear if from stroke or contusion. \par \par Since then, she she has been very forgetful, of the event around the fall, and she has more STM issues. \par She has no recollection of the event. She has STM issues, and low concentration. \par \par -Memory: STM\par -Speech: after event now better\par -Orientation: ok\par -Praxis: \par -Decision making/Executive fx/Multitasking: ok\par \par -Sleep: difficulty falling asleep, wakes up frequently for urination\par \par -Appetite: poor diet, mostly MW, appetite ok, but lost 20lb in the last 6 months.\par \par -Motor symptoms: uses cane for safety\par \par -B/B: urinary frequency mostly at night\par \par -Psychiatric symptoms: feels alone and isolated being home alone\par \par -Functional status:\par ADL: full\par IADL: gets food from neighbor, drives, still doing shopping and finance\par CDR: 0.5\par \par -Professional status: retired 20y, factory work\par \par PCP and other physicians:\par -PCP: [facility]\par -Ortho: [facility]\par \par Workup done:\par [facility], [facility] dc summary (not useful at all).\par

## 2021-01-26 NOTE — PHYSICAL EXAM
[General Appearance - Alert] : alert [General Appearance - In No Acute Distress] : in no acute distress [Oriented To Time, Place, And Person] : oriented to person, place, and time [Impaired Insight] : insight and judgment were intact [Affect] : the affect was normal [Person] : oriented to person [Place] : oriented to place [Time] : oriented to time [Concentration Intact] : normal concentrating ability [Visual Intact] : visual attention was ~T not ~L decreased [Naming Objects] : no difficulty naming common objects [Repeating Phrases] : no difficulty repeating a phrase [Writing A Sentence] : no difficulty writing a sentence [Fluency] : fluency intact [Comprehension] : comprehension intact [Reading] : reading intact [Current Events] : adequate knowledge of current events [Past History] : adequate knowledge of personal past history [Total Score ___ / 30] : the patient achieved a score of [unfilled] /30 [Date / Time ___ / 5] : date / time [unfilled] / 5 [Place ___ / 5] : place [unfilled] / 5 [Registration ___ / 3] : registration [unfilled] / 3 [Serial Sevens ___/5] : serial sevens [unfilled] / 5 [Naming 2 Objects ___ / 2] : naming two objects [unfilled] / 2 [Repeating a Sentence ___ / 1] : repeating a sentence [unfilled] / 1 [Writing a Sentence ___ / 1] : write sentence [unfilled] / 1 [3-stage Verbal Command ___ / 3] : three-stage verbal command [unfilled] / 3 [Written Command ___ / 1] : written command [unfilled] / 1 [Copy a Design ___ / 1] : copy a design [unfilled] / 1 [Recall ___ / 3] : recall [unfilled] / 3 [Cranial Nerves Oculomotor (III)] : extraocular motion intact [Cranial Nerves Optic (II)] : visual acuity intact bilaterally,  visual fields full to confrontation, pupils equal round and reactive to light [Cranial Nerves Trigeminal (V)] : facial sensation intact symmetrically [Cranial Nerves Facial (VII)] : face symmetrical [Cranial Nerves Vestibulocochlear (VIII)] : hearing was intact bilaterally [Cranial Nerves Glossopharyngeal (IX)] : tongue and palate midline [Cranial Nerves Accessory (XI - Cranial And Spinal)] : head turning and shoulder shrug symmetric [Cranial Nerves Hypoglossal (XII)] : there was no tongue deviation with protrusion [Involuntary Movements] : no involuntary movements were seen [Motor Strength] : muscle strength was normal in all four extremities [No Muscle Atrophy] : normal bulk in all four extremities [Motor Handedness Left-Handed] : the patient is left hand dominant [Sensation Tactile Decrease] : light touch was intact [Sensation Pain / Temperature Decrease] : pain and temperature was intact [Sensation Vibration Decrease] : vibration was intact [Proprioception] : proprioception was intact [Balance] : balance was intact [2+] : Ankle jerk left 2+ [Sclera] : the sclera and conjunctiva were normal [PERRL With Normal Accommodation] : pupils were equal in size, round, reactive to light, with normal accommodation [Extraocular Movements] : extraocular movements were intact [Optic Disc Abnormality] : the optic disc were normal in size and color [No APD] : no afferent pupillary defect [No MANJULA] : no internuclear ophthalmoplegia [Full Visual Field] : full visual field [Outer Ear] : the ears and nose were normal in appearance [Oropharynx] : the oropharynx was normal [Neck Appearance] : the appearance of the neck was normal [Neck Cervical Mass (___cm)] : no neck mass was observed [Jugular Venous Distention Increased] : there was no jugular-venous distention [Thyroid Diffuse Enlargement] : the thyroid was not enlarged [Thyroid Nodule] : there were no palpable thyroid nodules [Auscultation Breath Sounds / Voice Sounds] : lungs were clear to auscultation bilaterally [Heart Rate And Rhythm] : heart rate was normal and rhythm regular [Heart Sounds] : normal S1 and S2 [Heart Sounds Gallop] : no gallops [Murmurs] : no murmurs [Heart Sounds Pericardial Friction Rub] : no pericardial rub [Arterial Pulses Carotid] : carotid pulses were normal with no bruits [Full Pulse] : the pedal pulses are present [Edema] : there was no peripheral edema [Bowel Sounds] : normal bowel sounds [Abdomen Soft] : soft [Abdomen Tenderness] : non-tender [Abdomen Mass (___ Cm)] : no abdominal mass palpated [No CVA Tenderness] : no ~M costovertebral angle tenderness [No Spinal Tenderness] : no spinal tenderness [Abnormal Walk] : normal gait [Nail Clubbing] : no clubbing  or cyanosis of the fingernails [Motor Tone] : muscle strength and tone were normal [Musculoskeletal - Swelling] : no joint swelling seen [Skin Color & Pigmentation] : normal skin color and pigmentation [Skin Turgor] : normal skin turgor [] : no rash [Motor Strength Upper Extremities Bilaterally] : strength was normal in both upper extremities [Motor Strength Lower Extremities Bilaterally] : strength was normal in both lower extremities [Allodynia] : no ~T allodynia present [Romberg's Sign] : Romberg's sign was negtive [Dysesthesia] : no dysesthesia [Hyperesthesia] : no hyperesthesia [Limited Balance] : balance was intact [Past-pointing] : there was no past-pointing [Tremor] : no tremor present [Plantar Reflex Right Only] : normal on the right [Plantar Reflex Left Only] : normal on the left [FreeTextEntry4] : Mental Status Exam\par Presidents: 4/5\par Alternating Pattern: ok\par Spiral: ok\par Clock: 3/3\par Repetition: ok\par  \par R/L discrimination on self and examiner: ok\par Cross-line commands: ok\par Praxis:\par -Motor: ok\par -Dynamic/Luria: ok\par -Ideomotor/Imitation: ok \par -Ideational/writing/closing-in: ok\par -Dressing: ok. [FreeTextEntry8] : slightly cautioned and unstable gait, no kelli ataxia.  [FreeTextEntry1] : R pupil hyporeactive, s/p sx in R eye (Lasic?)

## 2021-01-26 NOTE — ASSESSMENT
[FreeTextEntry1] : Assessment:\par 76yo LH WW, with fall and possible L temporal stroke in 9/2020. Since the, amnesia Re events and STM issues. \par MS exam and neuro exam are ok today.\par Issues seem to stem from attention rather than purely STM.\par Likely vascular etiology.\par Will need to determine it the event was a stroke and etiology.\par \par Diagnostic Impression:\par -Stroke\par -SDH\par -Amnesia\par \par Plan:\par Rule out reversible and vascular causes:\par -B vitamins, TFT, RPR\par -MRI brain w/o morgan\par -MRA h/n\par -basic inflammatory labs\par -Lyme serology\par -will consider starting ASA81 after MRI, r/o SWI lesions\par -Will refer to stroke neuro after tests and will consider loop recorder if needed. \par \par \par A thorough discussion was entertained with the patient/caregiver regarding the use of psychoactive medications, their possible benefits and AE profile, including the risk of cardiovascular complications, including but not limited to applicable black box warning and teratogenicity, where appropriate.\par We discussed the benefits of being active, physically and mentally, and the need to to establish a routine in this respect.\par Driving abilities and firearms possession and use were discussed, in relation to progression of the cognitive decline, and the need to assess them periodically.\par Patient/caregiver advised to bring previous records to this office.\par Patient/caregiver fully understands and agree with the plan.\par

## 2021-01-26 NOTE — DATA REVIEWED
[de-identified] : I reviewed the CT head from 10/19/2020: L temporal lobe encephalomalacia, possible inferior division M2 L side, no SDH; Diffuse MVD,

## 2021-01-29 LAB
FOLATE SERPL-MCNC: 9.1 NG/ML
T3FREE SERPL-MCNC: 2.92 PG/ML
T4 FREE SERPL-MCNC: 1.3 NG/DL
TSH SERPL-ACNC: 1.96 UIU/ML
VIT B12 SERPL-MCNC: 296 PG/ML

## 2021-01-31 DIAGNOSIS — R79.89 OTHER SPECIFIED ABNORMAL FINDINGS OF BLOOD CHEMISTRY: ICD-10-CM

## 2021-01-31 LAB
B BURGDOR AB SER-IMP: NEGATIVE
B BURGDOR IGG+IGM SER QL: 0.26 INDEX
HCYS SERPL-MCNC: 18.8 UMOL/L
T PALLIDUM AB SER QL IA: NEGATIVE

## 2021-02-02 ENCOUNTER — RX RENEWAL (OUTPATIENT)
Age: 76
End: 2021-02-02

## 2021-02-07 LAB — METHYLMALONATE SERPL-SCNC: 427 NMOL/L

## 2021-02-10 LAB — VIT B1 SERPL-MCNC: 108.2 NMOL/L

## 2021-02-17 ENCOUNTER — RX CHANGE (OUTPATIENT)
Age: 76
End: 2021-02-17

## 2021-03-04 ENCOUNTER — OUTPATIENT (OUTPATIENT)
Dept: OUTPATIENT SERVICES | Facility: HOSPITAL | Age: 76
LOS: 1 days | End: 2021-03-04
Payer: MEDICARE

## 2021-03-04 ENCOUNTER — RESULT REVIEW (OUTPATIENT)
Age: 76
End: 2021-03-04

## 2021-03-04 ENCOUNTER — APPOINTMENT (OUTPATIENT)
Dept: MRI IMAGING | Facility: CLINIC | Age: 76
End: 2021-03-04

## 2021-03-04 DIAGNOSIS — S06.5X9A TRAUMATIC SUBDURAL HEMORRHAGE WITH LOSS OF CONSCIOUSNESS OF UNSPECIFIED DURATION, INITIAL ENCOUNTER: ICD-10-CM

## 2021-03-04 DIAGNOSIS — R41.3 OTHER AMNESIA: ICD-10-CM

## 2021-03-04 DIAGNOSIS — Z86.73 PERSONAL HISTORY OF TRANSIENT ISCHEMIC ATTACK (TIA), AND CEREBRAL INFARCTION WITHOUT RESIDUAL DEFICITS: ICD-10-CM

## 2021-03-04 PROCEDURE — 70544 MR ANGIOGRAPHY HEAD W/O DYE: CPT | Mod: 26,MH,59

## 2021-03-04 PROCEDURE — 70544 MR ANGIOGRAPHY HEAD W/O DYE: CPT

## 2021-03-04 PROCEDURE — 70551 MRI BRAIN STEM W/O DYE: CPT

## 2021-03-04 PROCEDURE — 70551 MRI BRAIN STEM W/O DYE: CPT | Mod: 26,MH

## 2021-03-04 PROCEDURE — 70547 MR ANGIOGRAPHY NECK W/O DYE: CPT

## 2021-03-04 PROCEDURE — 70547 MR ANGIOGRAPHY NECK W/O DYE: CPT | Mod: 26,MH

## 2021-03-10 ENCOUNTER — APPOINTMENT (OUTPATIENT)
Dept: INTERNAL MEDICINE | Facility: CLINIC | Age: 76
End: 2021-03-10
Payer: MEDICARE

## 2021-03-10 VITALS
SYSTOLIC BLOOD PRESSURE: 120 MMHG | BODY MASS INDEX: 26.81 KG/M2 | TEMPERATURE: 96.9 F | HEART RATE: 78 BPM | OXYGEN SATURATION: 95 % | DIASTOLIC BLOOD PRESSURE: 70 MMHG | WEIGHT: 176.92 LBS | HEIGHT: 68 IN | RESPIRATION RATE: 12 BRPM

## 2021-03-10 DIAGNOSIS — M26.621 ARTHRALGIA OF RIGHT TEMPOROMANDIBULAR JOINT: ICD-10-CM

## 2021-03-10 PROCEDURE — 99072 ADDL SUPL MATRL&STAF TM PHE: CPT

## 2021-03-10 PROCEDURE — 99214 OFFICE O/P EST MOD 30 MIN: CPT

## 2021-03-15 LAB
ALBUMIN SERPL ELPH-MCNC: 4.1 G/DL
ALP BLD-CCNC: 66 U/L
ALT SERPL-CCNC: 8 U/L
AMYLASE/CREAT SERPL: 65 U/L
ANION GAP SERPL CALC-SCNC: 11 MMOL/L
AST SERPL-CCNC: 18 U/L
BASOPHILS # BLD AUTO: 0.05 K/UL
BASOPHILS NFR BLD AUTO: 0.8 %
BILIRUB SERPL-MCNC: 0.4 MG/DL
BUN SERPL-MCNC: 12 MG/DL
CALCIUM SERPL-MCNC: 9.8 MG/DL
CHLORIDE SERPL-SCNC: 103 MMOL/L
CO2 SERPL-SCNC: 25 MMOL/L
CREAT SERPL-MCNC: 0.95 MG/DL
EOSINOPHIL # BLD AUTO: 0.04 K/UL
EOSINOPHIL NFR BLD AUTO: 0.6 %
ERYTHROCYTE [SEDIMENTATION RATE] IN BLOOD BY WESTERGREN METHOD: 13 MM/HR
GLUCOSE SERPL-MCNC: 103 MG/DL
HCT VFR BLD CALC: 42.2 %
HGB BLD-MCNC: 13.7 G/DL
IMM GRANULOCYTES NFR BLD AUTO: 0.2 %
LYMPHOCYTES # BLD AUTO: 2.34 K/UL
LYMPHOCYTES NFR BLD AUTO: 36.7 %
MAN DIFF?: NORMAL
MCHC RBC-ENTMCNC: 28.6 PG
MCHC RBC-ENTMCNC: 32.5 GM/DL
MCV RBC AUTO: 88.1 FL
MONOCYTES # BLD AUTO: 0.5 K/UL
MONOCYTES NFR BLD AUTO: 7.8 %
NEUTROPHILS # BLD AUTO: 3.44 K/UL
NEUTROPHILS NFR BLD AUTO: 53.9 %
PLATELET # BLD AUTO: 209 K/UL
POTASSIUM SERPL-SCNC: 3.5 MMOL/L
PROT SERPL-MCNC: 7.1 G/DL
RBC # BLD: 4.79 M/UL
RBC # FLD: 13.9 %
SODIUM SERPL-SCNC: 140 MMOL/L
WBC # FLD AUTO: 6.38 K/UL

## 2021-04-05 NOTE — PHYSICAL EXAM
[No Acute Distress] : no acute distress [Well Nourished] : well nourished [Well Developed] : well developed [Well-Appearing] : well-appearing [Normal Sclera/Conjunctiva] : normal sclera/conjunctiva [EOMI] : extraocular movements intact [Normal Outer Ear/Nose] : the outer ears and nose were normal in appearance [Normal TMs] : both tympanic membranes were normal [No JVD] : no jugular venous distention [No Lymphadenopathy] : no lymphadenopathy [Supple] : supple [Thyroid Normal, No Nodules] : the thyroid was normal and there were no nodules present [No Respiratory Distress] : no respiratory distress  [No Accessory Muscle Use] : no accessory muscle use [Clear to Auscultation] : lungs were clear to auscultation bilaterally [Normal Rate] : normal rate  [Regular Rhythm] : with a regular rhythm [Normal S1, S2] : normal S1 and S2 [No Carotid Bruits] : no carotid bruits [Pedal Pulses Present] : the pedal pulses are present [No Edema] : there was no peripheral edema [Soft] : abdomen soft [Non Tender] : non-tender [Non-distended] : non-distended [No Masses] : no abdominal mass palpated [Normal Bowel Sounds] : normal bowel sounds [Normal Posterior Cervical Nodes] : no posterior cervical lymphadenopathy [Normal Anterior Cervical Nodes] : no anterior cervical lymphadenopathy [No CVA Tenderness] : no CVA  tenderness [No Spinal Tenderness] : no spinal tenderness [No Joint Swelling] : no joint swelling [Grossly Normal Strength/Tone] : grossly normal strength/tone [No Rash] : no rash [No Focal Deficits] : no focal deficits [Normal Gait] : normal gait [Normal Affect] : the affect was normal [Normal Insight/Judgement] : insight and judgment were intact [de-identified] : + RT TMJ tenderness/ unable to open mouth fuly due to pain

## 2021-04-05 NOTE — PLAN
[FreeTextEntry1] : 1. see plan\par pt refused to go to ED for further evaluation and management\par pt instructed to go to ED if unable to eat

## 2021-04-05 NOTE — HISTORY OF PRESENT ILLNESS
[de-identified] : 75 year old female with h/o Htn/ Hld / s/p fall 9/2020 with L SDH  presents for evaluation of RT TMJ pain x 2 weeks . She is having difficulty  to open mouth and eat > states it  hard to chew\par She denies recent dental procedure, denies earache, fever, chills , N, V , abd pain

## 2021-04-07 ENCOUNTER — APPOINTMENT (OUTPATIENT)
Dept: NEUROLOGY | Facility: CLINIC | Age: 76
End: 2021-04-07
Payer: MEDICARE

## 2021-04-07 VITALS
SYSTOLIC BLOOD PRESSURE: 115 MMHG | BODY MASS INDEX: 26.67 KG/M2 | DIASTOLIC BLOOD PRESSURE: 71 MMHG | WEIGHT: 176 LBS | HEART RATE: 81 BPM | HEIGHT: 68 IN

## 2021-04-07 PROCEDURE — 99215 OFFICE O/P EST HI 40 MIN: CPT

## 2021-04-12 ENCOUNTER — APPOINTMENT (OUTPATIENT)
Dept: NEUROLOGY | Facility: CLINIC | Age: 76
End: 2021-04-12

## 2021-04-12 DIAGNOSIS — R41.3 OTHER AMNESIA: ICD-10-CM

## 2021-05-04 ENCOUNTER — APPOINTMENT (OUTPATIENT)
Dept: INTERNAL MEDICINE | Facility: CLINIC | Age: 76
End: 2021-05-04
Payer: MEDICARE

## 2021-05-04 ENCOUNTER — NON-APPOINTMENT (OUTPATIENT)
Age: 76
End: 2021-05-04

## 2021-05-04 VITALS
TEMPERATURE: 97.1 F | BODY MASS INDEX: 27.74 KG/M2 | OXYGEN SATURATION: 95 % | HEART RATE: 80 BPM | RESPIRATION RATE: 12 BRPM | WEIGHT: 183 LBS | DIASTOLIC BLOOD PRESSURE: 66 MMHG | SYSTOLIC BLOOD PRESSURE: 131 MMHG | HEIGHT: 68 IN

## 2021-05-04 DIAGNOSIS — Z86.73 PERSONAL HISTORY OF TRANSIENT ISCHEMIC ATTACK (TIA), AND CEREBRAL INFARCTION W/OUT RESIDUAL DEFICITS: ICD-10-CM

## 2021-05-04 PROCEDURE — 93000 ELECTROCARDIOGRAM COMPLETE: CPT

## 2021-05-04 PROCEDURE — 99072 ADDL SUPL MATRL&STAF TM PHE: CPT

## 2021-05-04 PROCEDURE — 99214 OFFICE O/P EST MOD 30 MIN: CPT | Mod: 25

## 2021-05-04 RX ORDER — AMOXICILLIN AND CLAVULANATE POTASSIUM 875; 125 MG/1; MG/1
875-125 TABLET, COATED ORAL TWICE DAILY
Qty: 10 | Refills: 0 | Status: DISCONTINUED | COMMUNITY
Start: 2021-03-10 | End: 2021-05-04

## 2021-05-04 RX ORDER — METHYLPREDNISOLONE 4 MG/1
4 TABLET ORAL
Qty: 1 | Refills: 0 | Status: DISCONTINUED | COMMUNITY
Start: 2021-03-10 | End: 2021-05-04

## 2021-05-04 NOTE — PHYSICAL EXAM

## 2021-05-05 LAB
25(OH)D3 SERPL-MCNC: 55 NG/ML
ALBUMIN SERPL ELPH-MCNC: 4.1 G/DL
ALP BLD-CCNC: 69 U/L
ALT SERPL-CCNC: 12 U/L
ANION GAP SERPL CALC-SCNC: 14 MMOL/L
AST SERPL-CCNC: 18 U/L
BASOPHILS # BLD AUTO: 0.04 K/UL
BASOPHILS NFR BLD AUTO: 0.6 %
BILIRUB SERPL-MCNC: 0.5 MG/DL
BUN SERPL-MCNC: 14 MG/DL
CALCIUM SERPL-MCNC: 9.7 MG/DL
CHLORIDE SERPL-SCNC: 103 MMOL/L
CO2 SERPL-SCNC: 26 MMOL/L
CREAT SERPL-MCNC: 0.98 MG/DL
EOSINOPHIL # BLD AUTO: 0.06 K/UL
EOSINOPHIL NFR BLD AUTO: 0.9 %
ESTIMATED AVERAGE GLUCOSE: 105 MG/DL
FERRITIN SERPL-MCNC: 62 NG/ML
FOLATE SERPL-MCNC: 16.1 NG/ML
GLUCOSE SERPL-MCNC: 85 MG/DL
GLUCOSE SERPL-MCNC: 95 MG/DL
HBA1C MFR BLD HPLC: 5.3 %
HCT VFR BLD CALC: 41.9 %
HCYS SERPL-MCNC: 17.6 UMOL/L
HGB BLD-MCNC: 13.2 G/DL
IMM GRANULOCYTES NFR BLD AUTO: 0.1 %
IRON SERPL-MCNC: 62 UG/DL
LYMPHOCYTES # BLD AUTO: 2.33 K/UL
LYMPHOCYTES NFR BLD AUTO: 33.9 %
MAN DIFF?: NORMAL
MCHC RBC-ENTMCNC: 28.4 PG
MCHC RBC-ENTMCNC: 31.5 GM/DL
MCV RBC AUTO: 90.3 FL
MONOCYTES # BLD AUTO: 0.54 K/UL
MONOCYTES NFR BLD AUTO: 7.8 %
NEUTROPHILS # BLD AUTO: 3.9 K/UL
NEUTROPHILS NFR BLD AUTO: 56.7 %
PLATELET # BLD AUTO: 203 K/UL
POTASSIUM SERPL-SCNC: 3.6 MMOL/L
PROT SERPL-MCNC: 6.8 G/DL
RBC # BLD: 4.64 M/UL
RBC # FLD: 14.6 %
SODIUM SERPL-SCNC: 143 MMOL/L
TSH SERPL-ACNC: 1.73 UIU/ML
VIT B12 SERPL-MCNC: 288 PG/ML
WBC # FLD AUTO: 6.88 K/UL

## 2021-05-10 ENCOUNTER — NON-APPOINTMENT (OUTPATIENT)
Age: 76
End: 2021-05-10

## 2021-05-19 LAB
CHOLEST SERPL-MCNC: 127 MG/DL
HDLC SERPL-MCNC: 52 MG/DL
LDLC SERPL CALC-MCNC: 54 MG/DL
NONHDLC SERPL-MCNC: 75 MG/DL
TRIGL SERPL-MCNC: 104 MG/DL

## 2021-07-07 ENCOUNTER — RX RENEWAL (OUTPATIENT)
Age: 76
End: 2021-07-07

## 2021-07-27 ENCOUNTER — APPOINTMENT (OUTPATIENT)
Dept: NEUROLOGY | Facility: CLINIC | Age: 76
End: 2021-07-27
Payer: MEDICARE

## 2021-07-27 VITALS
HEIGHT: 68 IN | WEIGHT: 293 LBS | DIASTOLIC BLOOD PRESSURE: 66 MMHG | BODY MASS INDEX: 44.41 KG/M2 | SYSTOLIC BLOOD PRESSURE: 122 MMHG | HEART RATE: 82 BPM

## 2021-07-27 DIAGNOSIS — S09.90XA UNSPECIFIED INJURY OF HEAD, INITIAL ENCOUNTER: ICD-10-CM

## 2021-07-27 PROCEDURE — 99214 OFFICE O/P EST MOD 30 MIN: CPT

## 2021-07-27 NOTE — ASSESSMENT
[FreeTextEntry1] : Assessment:\par 74yo LH WW, with fall and possible L temporal stroke in 9/2020. Since then, amnesia Re events and STM issues. \par MS exam and neuro exam are still intact.\par Some difficulties with recall, but overall does well.\par Attention seems to be the issue.\par Apathy is relevant, per pt, in daily life.\par \par Has taken FA and B12 for MMA/HCy. Will check levels again.\par \par Diagnostic Impression:\par -Stroke\par -SDH\par -Amnesia\par -apathy\par -forgetfulness\par \par Plan:\par -resume ASA81\par -check MMA and HCy\par -consider Wellbutrin for apathy and recommend senior center.\par \par \par A thorough discussion was entertained with the patient/caregiver regarding the use of psychoactive medications, their possible benefits and AE profile, including the risk of cardiovascular complications, including but not limited to applicable black box warning and teratogenicity, where appropriate.\par We discussed the benefits of being active, physically and mentally, and the need to to establish a routine in this respect.\par Driving abilities and firearms possession and use were discussed, in relation to progression of the cognitive decline, and the need to assess them periodically.\par Patient/caregiver advised to bring previous records to this office.\par Patient/caregiver fully understands and agree with the plan.\par

## 2021-07-27 NOTE — PHYSICAL EXAM
[General Appearance - Alert] : alert [General Appearance - In No Acute Distress] : in no acute distress [Oriented To Time, Place, And Person] : oriented to person, place, and time [Impaired Insight] : insight and judgment were intact [Affect] : the affect was normal [Person] : oriented to person [Place] : oriented to place [Time] : oriented to time [Concentration Intact] : normal concentrating ability [Visual Intact] : visual attention was ~T not ~L decreased [Naming Objects] : no difficulty naming common objects [Repeating Phrases] : no difficulty repeating a phrase [Writing A Sentence] : no difficulty writing a sentence [Fluency] : fluency intact [Comprehension] : comprehension intact [Reading] : reading intact [Current Events] : adequate knowledge of current events [Past History] : adequate knowledge of personal past history [Total Score ___ / 30] : the patient achieved a score of [unfilled] /30 [Date / Time ___ / 5] : date / time [unfilled] / 5 [Place ___ / 5] : place [unfilled] / 5 [Registration ___ / 3] : registration [unfilled] / 3 [Serial Sevens ___/5] : serial sevens [unfilled] / 5 [Naming 2 Objects ___ / 2] : naming two objects [unfilled] / 2 [Repeating a Sentence ___ / 1] : repeating a sentence [unfilled] / 1 [Writing a Sentence ___ / 1] : write sentence [unfilled] / 1 [3-stage Verbal Command ___ / 3] : three-stage verbal command [unfilled] / 3 [Written Command ___ / 1] : written command [unfilled] / 1 [Copy a Design ___ / 1] : copy a design [unfilled] / 1 [Recall ___ / 3] : recall [unfilled] / 3 [Cranial Nerves Optic (II)] : visual acuity intact bilaterally,  visual fields full to confrontation, pupils equal round and reactive to light [Cranial Nerves Oculomotor (III)] : extraocular motion intact [Cranial Nerves Trigeminal (V)] : facial sensation intact symmetrically [Cranial Nerves Facial (VII)] : face symmetrical [Cranial Nerves Vestibulocochlear (VIII)] : hearing was intact bilaterally [Cranial Nerves Glossopharyngeal (IX)] : tongue and palate midline [Cranial Nerves Accessory (XI - Cranial And Spinal)] : head turning and shoulder shrug symmetric [Cranial Nerves Hypoglossal (XII)] : there was no tongue deviation with protrusion [Motor Strength] : muscle strength was normal in all four extremities [Involuntary Movements] : no involuntary movements were seen [No Muscle Atrophy] : normal bulk in all four extremities [Motor Handedness Left-Handed] : the patient is left hand dominant [Sensation Tactile Decrease] : light touch was intact [Sensation Pain / Temperature Decrease] : pain and temperature was intact [Sensation Vibration Decrease] : vibration was intact [Proprioception] : proprioception was intact [Balance] : balance was intact [2+] : Ankle jerk left 2+ [Sclera] : the sclera and conjunctiva were normal [Extraocular Movements] : extraocular movements were intact [PERRL With Normal Accommodation] : pupils were equal in size, round, reactive to light, with normal accommodation [Optic Disc Abnormality] : the optic disc were normal in size and color [No APD] : no afferent pupillary defect [No MANJULA] : no internuclear ophthalmoplegia [Full Visual Field] : full visual field [Outer Ear] : the ears and nose were normal in appearance [Oropharynx] : the oropharynx was normal [Neck Appearance] : the appearance of the neck was normal [Neck Cervical Mass (___cm)] : no neck mass was observed [Jugular Venous Distention Increased] : there was no jugular-venous distention [Thyroid Diffuse Enlargement] : the thyroid was not enlarged [Thyroid Nodule] : there were no palpable thyroid nodules [Auscultation Breath Sounds / Voice Sounds] : lungs were clear to auscultation bilaterally [Heart Rate And Rhythm] : heart rate was normal and rhythm regular [Heart Sounds] : normal S1 and S2 [Heart Sounds Gallop] : no gallops [Murmurs] : no murmurs [Heart Sounds Pericardial Friction Rub] : no pericardial rub [Arterial Pulses Carotid] : carotid pulses were normal with no bruits [Full Pulse] : the pedal pulses are present [Edema] : there was no peripheral edema [Bowel Sounds] : normal bowel sounds [Abdomen Soft] : soft [Abdomen Tenderness] : non-tender [Abdomen Mass (___ Cm)] : no abdominal mass palpated [No CVA Tenderness] : no ~M costovertebral angle tenderness [No Spinal Tenderness] : no spinal tenderness [Abnormal Walk] : normal gait [Nail Clubbing] : no clubbing  or cyanosis of the fingernails [Musculoskeletal - Swelling] : no joint swelling seen [Motor Tone] : muscle strength and tone were normal [Skin Color & Pigmentation] : normal skin color and pigmentation [Skin Turgor] : normal skin turgor [] : no rash [Motor Strength Upper Extremities Bilaterally] : strength was normal in both upper extremities [Motor Strength Lower Extremities Bilaterally] : strength was normal in both lower extremities [Romberg's Sign] : Romberg's sign was negtive [Allodynia] : no ~T allodynia present [Dysesthesia] : no dysesthesia [Hyperesthesia] : no hyperesthesia [Limited Balance] : balance was intact [Past-pointing] : there was no past-pointing [Tremor] : no tremor present [Plantar Reflex Right Only] : normal on the right [Plantar Reflex Left Only] : normal on the left [FreeTextEntry4] : Mental Status Exam\par Presidents: 4/5\par Alternating Pattern: ok\par Spiral: ok\par Clock: 3/3\par Repetition: ok\par  \par R/L discrimination on self and examiner: ok\par Cross-line commands: ok\par Praxis:\par -Motor: ok\par -Dynamic/Luria: ok\par -Ideomotor/Imitation: ok \par -Ideational/writing/closing-in: ok\par -Dressing: ok. [FreeTextEntry8] : slightly cautioned and unstable gait, no ataxia. uses cane for safety. [FreeTextEntry1] : R pupil hyporeactive, s/p sx in R eye (Lasic?)

## 2021-07-27 NOTE — HISTORY OF PRESENT ILLNESS
[FreeTextEntry1] : HPI-Interval hx [id number]:\par Since last seen, pt has been stable.\par She has not taken ASA81, though was recommended by Stroke in 4/2021.\par She has been getting some bruises in her hands when bumping into things.\par \par Sleep ok, no day time napping.\par Appetite ab it reduced, but no weight loss.\par \par Some apathy, admits to have poor motivation.\par \par ADL and IADL ok, drives locally and day time.\par \par \par HPI: 76yo LH WW, with HNT, HLD, s/p fall in 9/2020 with L temporal SDH and brain contusion, unclear if stroke, here for memory loss. \par \par PMH:\par in 9/2020 she was found on the floor in her garden, unconscious, with blood by her head. Taken to [facility], she was dx with SDH and brain contusion. unclear if stroke.\par Dc to rehab for 2 weeks thereafter.\par CTH in 10/2020 showe L temporal encephalomalacia, unclear if from stroke or contusion. \par \par Since then, she she has been very forgetful, of the event around the fall, and she has more STM issues. \par She has no recollection of the event. She has STM issues, and low concentration. \par \par -Memory: STM\par -Speech: after event now better\par -Orientation: ok\par -Praxis: \par -Decision making/Executive fx/Multitasking: ok\par \par -Sleep: difficulty falling asleep, wakes up frequently for urination\par \par -Appetite: poor diet, mostly MW, appetite ok, but lost 20lb in the last 6 months.\par \par -Motor symptoms: uses cane for safety\par \par -B/B: urinary frequency mostly at night\par \par -Psychiatric symptoms: feels alone and isolated being home alone\par \par -Functional status:\par ADL: full\par IADL: gets food from neighbor, drives, still doing shopping and finance\par CDR: 0.5\par \par -Professional status: retired 20y, factory work\par \par PCP and other physicians:\par -PCP: Porsha\par -Ortho: [facility]\par \par Workup done:\par CTh, [facility] dc summary (not useful at all).\par

## 2021-07-27 NOTE — DATA REVIEWED
[de-identified] : I reviewed the CT head from 10/19/2020: L temporal lobe encephalomalacia, possible inferior division M2 L side, no SDH; Diffuse MVD,

## 2021-09-09 ENCOUNTER — LABORATORY RESULT (OUTPATIENT)
Age: 76
End: 2021-09-09

## 2021-09-09 ENCOUNTER — APPOINTMENT (OUTPATIENT)
Dept: INTERNAL MEDICINE | Facility: CLINIC | Age: 76
End: 2021-09-09
Payer: MEDICARE

## 2021-09-09 VITALS — DIASTOLIC BLOOD PRESSURE: 60 MMHG | SYSTOLIC BLOOD PRESSURE: 120 MMHG

## 2021-09-09 VITALS
BODY MASS INDEX: 28.49 KG/M2 | OXYGEN SATURATION: 94 % | WEIGHT: 188 LBS | DIASTOLIC BLOOD PRESSURE: 68 MMHG | HEIGHT: 68 IN | RESPIRATION RATE: 12 BRPM | HEART RATE: 70 BPM | SYSTOLIC BLOOD PRESSURE: 126 MMHG | TEMPERATURE: 97.3 F

## 2021-09-09 DIAGNOSIS — R41.3 OTHER AMNESIA: ICD-10-CM

## 2021-09-09 DIAGNOSIS — I63.9 CEREBRAL INFARCTION, UNSPECIFIED: ICD-10-CM

## 2021-09-09 PROCEDURE — G0008: CPT

## 2021-09-09 PROCEDURE — 90662 IIV NO PRSV INCREASED AG IM: CPT

## 2021-09-09 PROCEDURE — 99214 OFFICE O/P EST MOD 30 MIN: CPT | Mod: 25

## 2021-09-09 RX ORDER — ATORVASTATIN CALCIUM 10 MG/1
10 TABLET, FILM COATED ORAL
Qty: 1 | Refills: 1 | Status: ACTIVE | COMMUNITY
Start: 2020-11-04

## 2021-09-09 RX ORDER — FOLIC ACID 1 MG/1
1 TABLET ORAL DAILY
Qty: 90 | Refills: 3 | Status: ACTIVE | COMMUNITY
Start: 2021-01-31 | End: 1900-01-01

## 2021-09-15 ENCOUNTER — NON-APPOINTMENT (OUTPATIENT)
Age: 76
End: 2021-09-15

## 2021-09-16 ENCOUNTER — NON-APPOINTMENT (OUTPATIENT)
Age: 76
End: 2021-09-16

## 2021-09-16 ENCOUNTER — APPOINTMENT (OUTPATIENT)
Dept: INTERNAL MEDICINE | Facility: CLINIC | Age: 76
End: 2021-09-16
Payer: MEDICARE

## 2021-09-16 VITALS
HEART RATE: 79 BPM | DIASTOLIC BLOOD PRESSURE: 66 MMHG | RESPIRATION RATE: 12 BRPM | OXYGEN SATURATION: 96 % | TEMPERATURE: 97.3 F | HEIGHT: 68 IN | SYSTOLIC BLOOD PRESSURE: 125 MMHG

## 2021-09-16 DIAGNOSIS — R55 SYNCOPE AND COLLAPSE: ICD-10-CM

## 2021-09-16 PROCEDURE — 99214 OFFICE O/P EST MOD 30 MIN: CPT | Mod: 25

## 2021-09-16 PROCEDURE — 93000 ELECTROCARDIOGRAM COMPLETE: CPT

## 2021-09-16 RX ORDER — ACETAMINOPHEN 500 MG/1
500 TABLET, COATED ORAL
Qty: 60 | Refills: 0 | Status: ACTIVE | COMMUNITY
Start: 2021-09-16

## 2021-09-16 NOTE — REVIEW OF SYSTEMS
[Joint Pain] : joint pain [Dizziness] : dizziness [Memory Loss] : memory loss [Unsteady Walking] : ataxia [Negative] : Heme/Lymph [Joint Stiffness] : no joint stiffness [Joint Swelling] : no joint swelling [Muscle Weakness] : no muscle weakness [Muscle Pain] : no muscle pain [Back Pain] : no back pain [Headache] : no headache [Fainting] : no fainting [Confusion] : no confusion [FreeTextEntry2] : SEE HPI [FreeTextEntry9] : RIGHT HIP RESOLVED

## 2021-09-17 ENCOUNTER — NON-APPOINTMENT (OUTPATIENT)
Age: 76
End: 2021-09-17

## 2021-09-17 LAB
25(OH)D3 SERPL-MCNC: 42.5 NG/ML
ALBUMIN SERPL ELPH-MCNC: 3.9 G/DL
ALP BLD-CCNC: 81 U/L
ALT SERPL-CCNC: 10 U/L
ANION GAP SERPL CALC-SCNC: 16 MMOL/L
APPEARANCE: ABNORMAL
AST SERPL-CCNC: 18 U/L
BASOPHILS # BLD AUTO: 0.05 K/UL
BASOPHILS NFR BLD AUTO: 0.8 %
BILIRUB SERPL-MCNC: 0.7 MG/DL
BILIRUBIN URINE: NEGATIVE
BLOOD URINE: NEGATIVE
BUN SERPL-MCNC: 24 MG/DL
CALCIUM SERPL-MCNC: 9.7 MG/DL
CHLORIDE SERPL-SCNC: 104 MMOL/L
CHOLEST SERPL-MCNC: 140 MG/DL
CO2 SERPL-SCNC: 23 MMOL/L
COLOR: YELLOW
CREAT SERPL-MCNC: 0.9 MG/DL
EOSINOPHIL # BLD AUTO: 0.05 K/UL
EOSINOPHIL NFR BLD AUTO: 0.8 %
ESTIMATED AVERAGE GLUCOSE: 111 MG/DL
FERRITIN SERPL-MCNC: 114 NG/ML
FOLATE SERPL-MCNC: >20 NG/ML
GLUCOSE QUALITATIVE U: NEGATIVE
GLUCOSE SERPL-MCNC: 108 MG/DL
GLUCOSE SERPL-MCNC: 120 MG/DL
HBA1C MFR BLD HPLC: 5.5 %
HCT VFR BLD CALC: 43.7 %
HDLC SERPL-MCNC: 43 MG/DL
HGB BLD-MCNC: 13.5 G/DL
IMM GRANULOCYTES NFR BLD AUTO: 0 %
IRON SERPL-MCNC: 80 UG/DL
KETONES URINE: NEGATIVE
LDLC SERPL CALC-MCNC: 76 MG/DL
LEUKOCYTE ESTERASE URINE: NEGATIVE
LYMPHOCYTES # BLD AUTO: 1.81 K/UL
LYMPHOCYTES NFR BLD AUTO: 30.3 %
MAGNESIUM SERPL-MCNC: 2.2 MG/DL
MAN DIFF?: NORMAL
MCHC RBC-ENTMCNC: 29 PG
MCHC RBC-ENTMCNC: 30.9 GM/DL
MCV RBC AUTO: 93.8 FL
MONOCYTES # BLD AUTO: 0.48 K/UL
MONOCYTES NFR BLD AUTO: 8 %
NEUTROPHILS # BLD AUTO: 3.58 K/UL
NEUTROPHILS NFR BLD AUTO: 60.1 %
NITRITE URINE: POSITIVE
NONHDLC SERPL-MCNC: 97 MG/DL
PH URINE: 7
PLATELET # BLD AUTO: 204 K/UL
POTASSIUM SERPL-SCNC: 3.4 MMOL/L
PROT SERPL-MCNC: 7 G/DL
PROTEIN URINE: NORMAL
RBC # BLD: 4.66 M/UL
RBC # FLD: 14.3 %
SODIUM SERPL-SCNC: 143 MMOL/L
SPECIFIC GRAVITY URINE: 1.02
TRIGL SERPL-MCNC: 103 MG/DL
TSH SERPL-ACNC: 1.72 UIU/ML
UROBILINOGEN URINE: NORMAL
VIT B12 SERPL-MCNC: 300 PG/ML
WBC # FLD AUTO: 5.97 K/UL

## 2021-09-20 LAB — T PALLIDUM AB SER QL IA: NEGATIVE

## 2021-09-23 DIAGNOSIS — N30.00 ACUTE CYSTITIS W/OUT HEMATURIA: ICD-10-CM

## 2021-10-04 ENCOUNTER — NON-APPOINTMENT (OUTPATIENT)
Age: 76
End: 2021-10-04

## 2021-10-04 ENCOUNTER — APPOINTMENT (OUTPATIENT)
Dept: ORTHOPEDIC SURGERY | Facility: CLINIC | Age: 76
End: 2021-10-04
Payer: MEDICARE

## 2021-10-04 VITALS
SYSTOLIC BLOOD PRESSURE: 128 MMHG | WEIGHT: 188 LBS | HEART RATE: 73 BPM | BODY MASS INDEX: 28.49 KG/M2 | HEIGHT: 68 IN | DIASTOLIC BLOOD PRESSURE: 77 MMHG

## 2021-10-04 DIAGNOSIS — M54.5 LOW BACK PAIN: ICD-10-CM

## 2021-10-04 DIAGNOSIS — G89.11 LOW BACK PAIN: ICD-10-CM

## 2021-10-04 PROCEDURE — 99214 OFFICE O/P EST MOD 30 MIN: CPT

## 2021-10-04 PROCEDURE — 72110 X-RAY EXAM L-2 SPINE 4/>VWS: CPT

## 2021-10-05 ENCOUNTER — NON-APPOINTMENT (OUTPATIENT)
Age: 76
End: 2021-10-05

## 2021-10-13 ENCOUNTER — APPOINTMENT (OUTPATIENT)
Dept: CARDIOLOGY | Facility: CLINIC | Age: 76
End: 2021-10-13
Payer: MEDICARE

## 2021-10-13 ENCOUNTER — LABORATORY RESULT (OUTPATIENT)
Age: 76
End: 2021-10-13

## 2021-10-13 VITALS
HEART RATE: 84 BPM | WEIGHT: 184 LBS | BODY MASS INDEX: 27.89 KG/M2 | SYSTOLIC BLOOD PRESSURE: 109 MMHG | DIASTOLIC BLOOD PRESSURE: 67 MMHG | TEMPERATURE: 97.3 F | OXYGEN SATURATION: 95 % | HEIGHT: 68 IN | RESPIRATION RATE: 12 BRPM

## 2021-10-13 DIAGNOSIS — R55 SYNCOPE AND COLLAPSE: ICD-10-CM

## 2021-10-13 PROCEDURE — 93000 ELECTROCARDIOGRAM COMPLETE: CPT

## 2021-10-13 PROCEDURE — 99214 OFFICE O/P EST MOD 30 MIN: CPT

## 2021-10-13 NOTE — DISCUSSION/SUMMARY
[___ Month(s)] : in [unfilled] month(s) [FreeTextEntry1] : The patient is a 76-year-old female ex smoker, hypertension,syncope with subdural hemorrhage,  now s/p fall with multiple SVT on event monitor\par #1 CV-  ECHO normal, start toprol 25mg daily for SVT as possible etiology\par #2 Carotid- doppler neg\par #3 Htn- continue amlodipine, stop HCTZ and K as low diastolic blood pressure may be contributory \par #4 Subdural hemorrhage- stable\par #5 Neuro and ortho- f/u Dr. Ch and Dr. Mccallum, back brace in place

## 2021-10-13 NOTE — PHYSICAL EXAM

## 2021-10-13 NOTE — HISTORY OF PRESENT ILLNESS
[FreeTextEntry1] : Vero had another fall and was treated at [facility]. WOrkup negative. She is wearing a back brace. She denies any CP, palpitaitons, dizziness or SOB. However, event monitor was placed and now here to f/u. \par \par ECG today for syncope

## 2021-10-14 LAB
APPEARANCE: ABNORMAL
BILIRUBIN URINE: NEGATIVE
BLOOD URINE: NEGATIVE
COLOR: YELLOW
GLUCOSE QUALITATIVE U: NEGATIVE
KETONES URINE: NEGATIVE
LEUKOCYTE ESTERASE URINE: NEGATIVE
NITRITE URINE: NEGATIVE
PH URINE: 7.5
PROTEIN URINE: NEGATIVE
SPECIFIC GRAVITY URINE: 1.01
UROBILINOGEN URINE: NORMAL

## 2021-10-26 ENCOUNTER — APPOINTMENT (OUTPATIENT)
Dept: NEUROLOGY | Facility: CLINIC | Age: 76
End: 2021-10-26
Payer: MEDICARE

## 2021-10-26 VITALS
BODY MASS INDEX: 27.58 KG/M2 | WEIGHT: 182 LBS | DIASTOLIC BLOOD PRESSURE: 66 MMHG | HEART RATE: 75 BPM | SYSTOLIC BLOOD PRESSURE: 122 MMHG | HEIGHT: 68 IN

## 2021-10-26 PROCEDURE — 99215 OFFICE O/P EST HI 40 MIN: CPT

## 2021-11-01 ENCOUNTER — APPOINTMENT (OUTPATIENT)
Dept: ORTHOPEDIC SURGERY | Facility: CLINIC | Age: 76
End: 2021-11-01
Payer: MEDICARE

## 2021-11-01 VITALS — SYSTOLIC BLOOD PRESSURE: 110 MMHG | HEART RATE: 68 BPM | DIASTOLIC BLOOD PRESSURE: 73 MMHG

## 2021-11-01 PROCEDURE — 72070 X-RAY EXAM THORAC SPINE 2VWS: CPT

## 2021-11-01 PROCEDURE — 99214 OFFICE O/P EST MOD 30 MIN: CPT

## 2021-11-30 ENCOUNTER — APPOINTMENT (OUTPATIENT)
Dept: ORTHOPEDIC SURGERY | Facility: CLINIC | Age: 76
End: 2021-11-30
Payer: MEDICARE

## 2021-11-30 ENCOUNTER — OUTPATIENT (OUTPATIENT)
Dept: OUTPATIENT SERVICES | Facility: HOSPITAL | Age: 76
LOS: 1 days | End: 2021-11-30
Payer: MEDICARE

## 2021-11-30 ENCOUNTER — APPOINTMENT (OUTPATIENT)
Dept: MRI IMAGING | Facility: CLINIC | Age: 76
End: 2021-11-30
Payer: MEDICARE

## 2021-11-30 VITALS
HEIGHT: 68 IN | DIASTOLIC BLOOD PRESSURE: 79 MMHG | HEART RATE: 66 BPM | BODY MASS INDEX: 26.67 KG/M2 | WEIGHT: 176 LBS | SYSTOLIC BLOOD PRESSURE: 133 MMHG

## 2021-11-30 DIAGNOSIS — I63.9 CEREBRAL INFARCTION, UNSPECIFIED: ICD-10-CM

## 2021-11-30 DIAGNOSIS — Z00.8 ENCOUNTER FOR OTHER GENERAL EXAMINATION: ICD-10-CM

## 2021-11-30 PROCEDURE — 99214 OFFICE O/P EST MOD 30 MIN: CPT

## 2021-11-30 PROCEDURE — 73522 X-RAY EXAM HIPS BI 3-4 VIEWS: CPT

## 2021-11-30 PROCEDURE — 70551 MRI BRAIN STEM W/O DYE: CPT

## 2021-11-30 PROCEDURE — 70551 MRI BRAIN STEM W/O DYE: CPT | Mod: 26

## 2021-12-02 ENCOUNTER — APPOINTMENT (OUTPATIENT)
Dept: INTERNAL MEDICINE | Facility: CLINIC | Age: 76
End: 2021-12-02
Payer: MEDICARE

## 2021-12-02 VITALS
DIASTOLIC BLOOD PRESSURE: 74 MMHG | TEMPERATURE: 97.1 F | BODY MASS INDEX: 27.74 KG/M2 | HEART RATE: 76 BPM | SYSTOLIC BLOOD PRESSURE: 145 MMHG | HEIGHT: 68 IN | WEIGHT: 183 LBS | RESPIRATION RATE: 12 BRPM | OXYGEN SATURATION: 93 %

## 2021-12-02 DIAGNOSIS — Z00.00 ENCOUNTER FOR GENERAL ADULT MEDICAL EXAMINATION W/OUT ABNORMAL FINDINGS: ICD-10-CM

## 2021-12-02 DIAGNOSIS — M25.552 PAIN IN LEFT HIP: ICD-10-CM

## 2021-12-02 PROCEDURE — 99213 OFFICE O/P EST LOW 20 MIN: CPT | Mod: 25

## 2021-12-02 PROCEDURE — 99397 PER PM REEVAL EST PAT 65+ YR: CPT

## 2021-12-02 RX ORDER — NITROFURANTOIN (MONOHYDRATE/MACROCRYSTALS) 25; 75 MG/1; MG/1
100 CAPSULE ORAL TWICE DAILY
Qty: 14 | Refills: 1 | Status: DISCONTINUED | COMMUNITY
Start: 2021-09-23 | End: 2021-12-02

## 2021-12-02 RX ORDER — POTASSIUM CHLORIDE 600 MG/1
8 CAPSULE, EXTENDED RELEASE ORAL DAILY
Qty: 30 | Refills: 5 | Status: DISCONTINUED | COMMUNITY
Start: 2021-09-17 | End: 2021-12-02

## 2021-12-02 RX ORDER — LIDOCAINE 5% 700 MG/1
5 PATCH TOPICAL
Qty: 1 | Refills: 0 | Status: ACTIVE | COMMUNITY
Start: 2021-12-02 | End: 1900-01-01

## 2021-12-02 NOTE — HISTORY OF PRESENT ILLNESS
[de-identified] : 76 year old female accompanied by her sister with history of HLD, HTN, COPD,s/p fall with   Low back pain/ T12 Vertebral compression fracture , SDH 9/2021 presents for physical \par \par  She is having lower back pain 10/10 sharp pain across lower back. Radiates down left leg at times. Pain is increased with prolonged sitting or standing  Denies numbness/tingling in lower extremities, bowel or bladder dysfunction. She reports history of fall with compression fracture. + h/o RTHR > 10 years ago.\par Pt following with ortho Dr Vinson > had X ray left hip > was found to have severe arthritis changes with bone on bone osteophytes and due to recent T12 compression fracture was recommended US guided steroid injection > awaiting for arrangement \par \par Denies any CP, SOB, N/V or abdominal pain \par Pt ambulates with cane

## 2021-12-02 NOTE — PHYSICAL EXAM
[No Acute Distress] : no acute distress [Well Nourished] : well nourished [Normal Sclera/Conjunctiva] : normal sclera/conjunctiva [EOMI] : extraocular movements intact [Normal Outer Ear/Nose] : the outer ears and nose were normal in appearance [Normal Oropharynx] : the oropharynx was normal [No JVD] : no jugular venous distention [No Lymphadenopathy] : no lymphadenopathy [Supple] : supple [No Respiratory Distress] : no respiratory distress  [No Accessory Muscle Use] : no accessory muscle use [Clear to Auscultation] : lungs were clear to auscultation bilaterally [Normal Rate] : normal rate  [Regular Rhythm] : with a regular rhythm [Normal S1, S2] : normal S1 and S2 [No Murmur] : no murmur heard [Pedal Pulses Present] : the pedal pulses are present [No Edema] : there was no peripheral edema [No Extremity Clubbing/Cyanosis] : no extremity clubbing/cyanosis [Soft] : abdomen soft [Non Tender] : non-tender [Non-distended] : non-distended [Normal Bowel Sounds] : normal bowel sounds [Normal Posterior Cervical Nodes] : no posterior cervical lymphadenopathy [Normal Anterior Cervical Nodes] : no anterior cervical lymphadenopathy [No CVA Tenderness] : no CVA  tenderness [Grossly Normal Strength/Tone] : grossly normal strength/tone [No Rash] : no rash [Coordination Grossly Intact] : coordination grossly intact [No Focal Deficits] : no focal deficits [Normal Affect] : the affect was normal [Normal Insight/Judgement] : insight and judgment were intact [de-identified] : + l/s spinal tenderness [de-identified] : + left hip tenderness with ROM  [de-identified] : ambulates with cane

## 2021-12-02 NOTE — HEALTH RISK ASSESSMENT
[Patient reported mammogram was normal] : Patient reported mammogram was normal [Patient reported bone density results were normal] : Patient reported bone density results were normal [Patient reported colonoscopy was normal] : Patient reported colonoscopy was normal [Alone] : lives alone [Retired] : retired [] :  [MammogramDate] : 2019 [BoneDensityDate] : 2019 [ColonoscopyDate] : 2015

## 2021-12-02 NOTE — PLAN
[FreeTextEntry1] : Physical annual\par see plan \par \par Mammo referral \par \par 1. HTN/HLD\par follows with cardiology \par cont Amlodipine 5 mg daily\par cont Atorvastatin 10 mg daily\par cont Metoprolol 25 mg daily\par \par 2.Low back pain/ T12 compression fracture / Lumbar Radiculopathy \par warm compresses\par Gabapentin 100 mg qhs \par Lidoderm patch 12 hours on 12 hours off\par ortho folow up \par \par 3. Left hip pain\par pain management

## 2021-12-07 ENCOUNTER — NON-APPOINTMENT (OUTPATIENT)
Age: 76
End: 2021-12-07

## 2021-12-10 LAB
ALBUMIN SERPL ELPH-MCNC: 4 G/DL
ALP BLD-CCNC: 132 U/L
ALT SERPL-CCNC: 13 U/L
ANION GAP SERPL CALC-SCNC: 12 MMOL/L
AST SERPL-CCNC: 23 U/L
BILIRUB SERPL-MCNC: 0.5 MG/DL
BUN SERPL-MCNC: 25 MG/DL
CALCIUM SERPL-MCNC: 9.8 MG/DL
CHLORIDE SERPL-SCNC: 105 MMOL/L
CO2 SERPL-SCNC: 26 MMOL/L
CREAT SERPL-MCNC: 0.89 MG/DL
GLUCOSE SERPL-MCNC: 84 MG/DL
POTASSIUM SERPL-SCNC: 4.2 MMOL/L
PROT SERPL-MCNC: 7.2 G/DL
SODIUM SERPL-SCNC: 142 MMOL/L

## 2021-12-13 ENCOUNTER — RESULT REVIEW (OUTPATIENT)
Age: 76
End: 2021-12-13

## 2021-12-13 ENCOUNTER — APPOINTMENT (OUTPATIENT)
Dept: ULTRASOUND IMAGING | Facility: CLINIC | Age: 76
End: 2021-12-13

## 2021-12-13 ENCOUNTER — OUTPATIENT (OUTPATIENT)
Dept: OUTPATIENT SERVICES | Facility: HOSPITAL | Age: 76
LOS: 1 days | End: 2021-12-13
Payer: MEDICARE

## 2021-12-13 DIAGNOSIS — M16.12 UNILATERAL PRIMARY OSTEOARTHRITIS, LEFT HIP: ICD-10-CM

## 2021-12-13 PROCEDURE — 20611 DRAIN/INJ JOINT/BURSA W/US: CPT | Mod: LT

## 2021-12-13 PROCEDURE — 20611 DRAIN/INJ JOINT/BURSA W/US: CPT

## 2021-12-20 ENCOUNTER — APPOINTMENT (OUTPATIENT)
Dept: ORTHOPEDIC SURGERY | Facility: CLINIC | Age: 76
End: 2021-12-20
Payer: MEDICARE

## 2021-12-20 VITALS — HEART RATE: 84 BPM | SYSTOLIC BLOOD PRESSURE: 129 MMHG | DIASTOLIC BLOOD PRESSURE: 75 MMHG

## 2021-12-20 DIAGNOSIS — M54.50 LOW BACK PAIN, UNSPECIFIED: ICD-10-CM

## 2021-12-20 DIAGNOSIS — S32.020D WEDGE COMPRESSION FRACTURE OF SECOND LUMBAR VERTEBRA, SUBSEQUENT ENCOUNTER FOR FRACTURE WITH ROUTINE HEALING: ICD-10-CM

## 2021-12-20 PROCEDURE — 99214 OFFICE O/P EST MOD 30 MIN: CPT

## 2021-12-20 PROCEDURE — 72100 X-RAY EXAM L-S SPINE 2/3 VWS: CPT

## 2021-12-28 ENCOUNTER — RX RENEWAL (OUTPATIENT)
Age: 76
End: 2021-12-28

## 2021-12-28 RX ORDER — DICLOFENAC SODIUM 75 MG/1
75 TABLET, DELAYED RELEASE ORAL
Qty: 60 | Refills: 1 | Status: ACTIVE | COMMUNITY
Start: 2021-11-01 | End: 1900-01-01

## 2022-01-10 ENCOUNTER — APPOINTMENT (OUTPATIENT)
Dept: ORTHOPEDIC SURGERY | Facility: CLINIC | Age: 77
End: 2022-01-10
Payer: MEDICARE

## 2022-01-10 VITALS
WEIGHT: 183 LBS | HEART RATE: 75 BPM | HEIGHT: 68 IN | BODY MASS INDEX: 27.74 KG/M2 | DIASTOLIC BLOOD PRESSURE: 70 MMHG | SYSTOLIC BLOOD PRESSURE: 111 MMHG

## 2022-01-10 DIAGNOSIS — S32.010K WEDGE COMPRESSION FRACTURE OF FIRST LUMBAR VERTEBRA, SUBSEQUENT ENCOUNTER FOR FRACTURE WITH NONUNION: ICD-10-CM

## 2022-01-10 DIAGNOSIS — S32.030A WEDGE COMPRESSION FRACTURE OF THIRD LUMBAR VERTEBRA, INITIAL ENCOUNTER FOR CLOSED FRACTURE: ICD-10-CM

## 2022-01-10 DIAGNOSIS — S22.080A WEDGE COMPRESSION FRACTURE OF T11-T12 VERTEBRA, INITIAL ENCOUNTER FOR CLOSED FRACTURE: ICD-10-CM

## 2022-01-10 PROCEDURE — 99214 OFFICE O/P EST MOD 30 MIN: CPT

## 2022-01-10 NOTE — REASON FOR VISIT
[Back Pain] : back pain [Family Member] : family member [FreeTextEntry2] : Multiple lumbar compression fractures. L1-2-3

## 2022-01-10 NOTE — DISCUSSION/SUMMARY
[Medication Risks Reviewed] : Medication risks reviewed [de-identified] : Review of previous x-rays performed in October and December 2021 revealed progressive and new compression deformities of the L3 L1 vertebral bodies is as well possibly T12.  Given the multiple fractures noted on x-rays previously and no MRI I recommend MRI of the thoracic and lumbar spine to better assess previously noted fractures.  She would be a candidate for kyphoplasty but needs to be determined which levels to be targeted with the cement augmentation.  He will contact the office after the MRI has been performed to schedule a type next.  She is not interested pain medications since they have not been helpful thus far.  She understands a cement augmentation would be appropriate but does need additional imaging prior to the procedure.  We discussed the option of her going to the hospital for imaging studies and definitive care for her pain but they declined.

## 2022-01-10 NOTE — HISTORY OF PRESENT ILLNESS
[Worsening] : worsening [10] : a current pain level of 10/10 [Walking] : walking [Daily] : ~He/She~ states the symptoms seem to be occuring daily [Bending] : worsened by bending [Lifting] : worsened by lifting [de-identified] : Patient is here today to discuss possible Kyphoplasty due to her multiple lumbar compressions fractures. Patient saw  12/20/21 xrays done positive for fractures. Patient given prescription for TLSO brace told to see  for surgery. Patient was given bone density prescription by her PCP has not made appointment at this time. Patient is ambulating with cane and back brace in a lot of pain 10 +.\par Fell in 9/2021. seen by Dr Mccallum had back brace applied with no improvement, seen hip surgeon, not hip issue. Had left hip injection with no relief.\par Subsequent Xrays showed additional fractures. Use LSO with no relief. [de-identified] : cane back brace

## 2022-01-10 NOTE — PHYSICAL EXAM
[Cane] : ambulates with cane [Normal] : Gait: normal [LE] : Sensory: Intact in bilateral lower extremities [Knee] : patellar 2+ and symmetric bilaterally [Ankle] : ankle 2+ and symmetric bilaterally [SLR] : negative straight leg raise [de-identified] : seen with her niece\par Seen with a lumbar corset.  Painful change of position.\par Paraspinal lumbar tenderness.

## 2022-01-19 ENCOUNTER — APPOINTMENT (OUTPATIENT)
Dept: CARDIOLOGY | Facility: CLINIC | Age: 77
End: 2022-01-19

## 2022-01-22 ENCOUNTER — APPOINTMENT (OUTPATIENT)
Dept: MRI IMAGING | Facility: CLINIC | Age: 77
End: 2022-01-22
Payer: MEDICARE

## 2022-01-22 ENCOUNTER — OUTPATIENT (OUTPATIENT)
Dept: OUTPATIENT SERVICES | Facility: HOSPITAL | Age: 77
LOS: 1 days | End: 2022-01-22
Payer: MEDICARE

## 2022-01-22 DIAGNOSIS — M54.50 LOW BACK PAIN, UNSPECIFIED: ICD-10-CM

## 2022-01-22 DIAGNOSIS — S32.010K: ICD-10-CM

## 2022-01-22 DIAGNOSIS — S32.030A WEDGE COMPRESSION FRACTURE OF THIRD LUMBAR VERTEBRA, INITIAL ENCOUNTER FOR CLOSED FRACTURE: ICD-10-CM

## 2022-01-22 PROCEDURE — 72146 MRI CHEST SPINE W/O DYE: CPT | Mod: 26

## 2022-01-22 PROCEDURE — 72148 MRI LUMBAR SPINE W/O DYE: CPT | Mod: 26

## 2022-01-22 PROCEDURE — 72148 MRI LUMBAR SPINE W/O DYE: CPT

## 2022-01-22 PROCEDURE — 72146 MRI CHEST SPINE W/O DYE: CPT

## 2022-02-01 ENCOUNTER — APPOINTMENT (OUTPATIENT)
Dept: NEUROLOGY | Facility: CLINIC | Age: 77
End: 2022-02-01

## 2022-02-03 ENCOUNTER — TRANSCRIPTION ENCOUNTER (OUTPATIENT)
Age: 77
End: 2022-02-03

## 2022-02-03 ENCOUNTER — INPATIENT (INPATIENT)
Facility: HOSPITAL | Age: 77
LOS: 1 days | Discharge: HOME | DRG: 543 | End: 2022-02-05
Attending: STUDENT IN AN ORGANIZED HEALTH CARE EDUCATION/TRAINING PROGRAM | Admitting: STUDENT IN AN ORGANIZED HEALTH CARE EDUCATION/TRAINING PROGRAM
Payer: MEDICARE

## 2022-02-03 VITALS
SYSTOLIC BLOOD PRESSURE: 112 MMHG | HEART RATE: 74 BPM | DIASTOLIC BLOOD PRESSURE: 70 MMHG | HEIGHT: 68 IN | WEIGHT: 171.3 LBS | TEMPERATURE: 98 F | RESPIRATION RATE: 16 BRPM

## 2022-02-03 DIAGNOSIS — M54.9 DORSALGIA, UNSPECIFIED: ICD-10-CM

## 2022-02-03 LAB
ABO RH CONFIRMATION: SIGNIFICANT CHANGE UP
ALBUMIN SERPL ELPH-MCNC: 3.3 G/DL — SIGNIFICANT CHANGE UP (ref 3.3–5)
ALP SERPL-CCNC: 102 U/L — SIGNIFICANT CHANGE UP (ref 30–120)
ALT FLD-CCNC: 18 U/L DA — SIGNIFICANT CHANGE UP (ref 10–60)
ANION GAP SERPL CALC-SCNC: 4 MMOL/L — LOW (ref 5–17)
APTT BLD: 28.4 SEC — SIGNIFICANT CHANGE UP (ref 27.5–35.5)
AST SERPL-CCNC: 16 U/L — SIGNIFICANT CHANGE UP (ref 10–40)
BASE EXCESS BLDV CALC-SCNC: 8.2 MMOL/L — HIGH (ref -2–3)
BASOPHILS # BLD AUTO: 0.06 K/UL — SIGNIFICANT CHANGE UP (ref 0–0.2)
BASOPHILS NFR BLD AUTO: 1.1 % — SIGNIFICANT CHANGE UP (ref 0–2)
BILIRUB SERPL-MCNC: 0.9 MG/DL — SIGNIFICANT CHANGE UP (ref 0.2–1.2)
BLD GP AB SCN SERPL QL: SIGNIFICANT CHANGE UP
BUN SERPL-MCNC: 14 MG/DL — SIGNIFICANT CHANGE UP (ref 7–23)
CALCIUM SERPL-MCNC: 9.3 MG/DL — SIGNIFICANT CHANGE UP (ref 8.4–10.5)
CHLORIDE SERPL-SCNC: 104 MMOL/L — SIGNIFICANT CHANGE UP (ref 96–108)
CO2 SERPL-SCNC: 31 MMOL/L — SIGNIFICANT CHANGE UP (ref 22–31)
CREAT SERPL-MCNC: 0.95 MG/DL — SIGNIFICANT CHANGE UP (ref 0.5–1.3)
EOSINOPHIL # BLD AUTO: 0.06 K/UL — SIGNIFICANT CHANGE UP (ref 0–0.5)
EOSINOPHIL NFR BLD AUTO: 1.1 % — SIGNIFICANT CHANGE UP (ref 0–6)
GLUCOSE SERPL-MCNC: 115 MG/DL — HIGH (ref 70–99)
HCO3 BLDV-SCNC: 32 MMOL/L — HIGH (ref 22–29)
HCT VFR BLD CALC: 39 % — SIGNIFICANT CHANGE UP (ref 34.5–45)
HGB BLD-MCNC: 12.9 G/DL — SIGNIFICANT CHANGE UP (ref 11.5–15.5)
HOROWITZ INDEX BLDV+IHG-RTO: 21 — SIGNIFICANT CHANGE UP
IMM GRANULOCYTES NFR BLD AUTO: 0.4 % — SIGNIFICANT CHANGE UP (ref 0–1.5)
INR BLD: 1.07 RATIO — SIGNIFICANT CHANGE UP (ref 0.88–1.16)
LYMPHOCYTES # BLD AUTO: 2.13 K/UL — SIGNIFICANT CHANGE UP (ref 1–3.3)
LYMPHOCYTES # BLD AUTO: 39.2 % — SIGNIFICANT CHANGE UP (ref 13–44)
MCHC RBC-ENTMCNC: 29.1 PG — SIGNIFICANT CHANGE UP (ref 27–34)
MCHC RBC-ENTMCNC: 33.1 GM/DL — SIGNIFICANT CHANGE UP (ref 32–36)
MCV RBC AUTO: 88 FL — SIGNIFICANT CHANGE UP (ref 80–100)
MONOCYTES # BLD AUTO: 0.45 K/UL — SIGNIFICANT CHANGE UP (ref 0–0.9)
MONOCYTES NFR BLD AUTO: 8.3 % — SIGNIFICANT CHANGE UP (ref 2–14)
NEUTROPHILS # BLD AUTO: 2.71 K/UL — SIGNIFICANT CHANGE UP (ref 1.8–7.4)
NEUTROPHILS NFR BLD AUTO: 49.9 % — SIGNIFICANT CHANGE UP (ref 43–77)
NRBC # BLD: 0 /100 WBCS — SIGNIFICANT CHANGE UP (ref 0–0)
PCO2 BLDV: 49 MMHG — HIGH (ref 39–42)
PH BLDV: 7.43 — SIGNIFICANT CHANGE UP (ref 7.32–7.43)
PLATELET # BLD AUTO: 199 K/UL — SIGNIFICANT CHANGE UP (ref 150–400)
PO2 BLDV: 60 MMHG — HIGH (ref 25–45)
POTASSIUM SERPL-MCNC: 3 MMOL/L — LOW (ref 3.5–5.3)
POTASSIUM SERPL-SCNC: 3 MMOL/L — LOW (ref 3.5–5.3)
PROT SERPL-MCNC: 6.8 G/DL — SIGNIFICANT CHANGE UP (ref 6–8.3)
PROTHROM AB SERPL-ACNC: 12.9 SEC — SIGNIFICANT CHANGE UP (ref 10.6–13.6)
RBC # BLD: 4.43 M/UL — SIGNIFICANT CHANGE UP (ref 3.8–5.2)
RBC # FLD: 13.6 % — SIGNIFICANT CHANGE UP (ref 10.3–14.5)
SAO2 % BLDV: 88.9 % — HIGH (ref 67–88)
SARS-COV-2 RNA SPEC QL NAA+PROBE: SIGNIFICANT CHANGE UP
SODIUM SERPL-SCNC: 139 MMOL/L — SIGNIFICANT CHANGE UP (ref 135–145)
WBC # BLD: 5.43 K/UL — SIGNIFICANT CHANGE UP (ref 3.8–10.5)
WBC # FLD AUTO: 5.43 K/UL — SIGNIFICANT CHANGE UP (ref 3.8–10.5)

## 2022-02-03 PROCEDURE — 70450 CT HEAD/BRAIN W/O DYE: CPT | Mod: 26

## 2022-02-03 PROCEDURE — 71045 X-RAY EXAM CHEST 1 VIEW: CPT | Mod: 26

## 2022-02-03 PROCEDURE — 99285 EMERGENCY DEPT VISIT HI MDM: CPT

## 2022-02-03 PROCEDURE — 93010 ELECTROCARDIOGRAM REPORT: CPT

## 2022-02-03 PROCEDURE — 99223 1ST HOSP IP/OBS HIGH 75: CPT

## 2022-02-03 RX ORDER — ACETAMINOPHEN 500 MG
650 TABLET ORAL EVERY 6 HOURS
Refills: 0 | Status: DISCONTINUED | OUTPATIENT
Start: 2022-02-03 | End: 2022-02-05

## 2022-02-03 RX ORDER — POTASSIUM CHLORIDE 20 MEQ
40 PACKET (EA) ORAL ONCE
Refills: 0 | Status: COMPLETED | OUTPATIENT
Start: 2022-02-03 | End: 2022-02-03

## 2022-02-03 RX ORDER — ALBUTEROL 90 UG/1
2 AEROSOL, METERED ORAL EVERY 6 HOURS
Refills: 0 | Status: DISCONTINUED | OUTPATIENT
Start: 2022-02-03 | End: 2022-02-05

## 2022-02-03 RX ORDER — HYDROCHLOROTHIAZIDE 25 MG
0 TABLET ORAL
Qty: 0 | Refills: 0 | DISCHARGE

## 2022-02-03 RX ORDER — SODIUM CHLORIDE 9 MG/ML
1000 INJECTION, SOLUTION INTRAVENOUS
Refills: 0 | Status: DISCONTINUED | OUTPATIENT
Start: 2022-02-04 | End: 2022-02-04

## 2022-02-03 RX ORDER — METOPROLOL TARTRATE 50 MG
25 TABLET ORAL DAILY
Refills: 0 | Status: DISCONTINUED | OUTPATIENT
Start: 2022-02-03 | End: 2022-02-04

## 2022-02-03 RX ORDER — AMLODIPINE BESYLATE 2.5 MG/1
5 TABLET ORAL DAILY
Refills: 0 | Status: DISCONTINUED | OUTPATIENT
Start: 2022-02-03 | End: 2022-02-04

## 2022-02-03 RX ORDER — AMLODIPINE BESYLATE 2.5 MG/1
0 TABLET ORAL
Qty: 0 | Refills: 0 | DISCHARGE

## 2022-02-03 RX ORDER — ATORVASTATIN CALCIUM 80 MG/1
10 TABLET, FILM COATED ORAL AT BEDTIME
Refills: 0 | Status: DISCONTINUED | OUTPATIENT
Start: 2022-02-03 | End: 2022-02-04

## 2022-02-03 RX ORDER — METOPROLOL TARTRATE 50 MG
0 TABLET ORAL
Qty: 0 | Refills: 0 | DISCHARGE

## 2022-02-03 RX ORDER — INFLUENZA VIRUS VACCINE 15; 15; 15; 15 UG/.5ML; UG/.5ML; UG/.5ML; UG/.5ML
0.7 SUSPENSION INTRAMUSCULAR ONCE
Refills: 0 | Status: DISCONTINUED | OUTPATIENT
Start: 2022-02-03 | End: 2022-02-05

## 2022-02-03 RX ADMIN — Medication 25 MILLIGRAM(S): at 13:27

## 2022-02-03 RX ADMIN — Medication 40 MILLIEQUIVALENT(S): at 12:00

## 2022-02-03 RX ADMIN — ATORVASTATIN CALCIUM 10 MILLIGRAM(S): 80 TABLET, FILM COATED ORAL at 22:35

## 2022-02-03 RX ADMIN — AMLODIPINE BESYLATE 5 MILLIGRAM(S): 2.5 TABLET ORAL at 13:26

## 2022-02-03 NOTE — ED PROVIDER NOTE - OBJECTIVE STATEMENT
pt sent by spine for admission for kyphoplasty tomorrow. pt relates hx back pain since fall in april 2021. denies weakness, numbness, incontinence, cp, sob, abd pain, urinary complaints.  pmd - forgot name  spine  [email or web address]

## 2022-02-03 NOTE — H&P ADULT - NSHPPHYSICALEXAM_GEN_ALL_CORE
Vitals:  T(C): 36.6 (02-03-22 @ 07:56), Max: 36.6 (02-03-22 @ 07:56)  HR: 78 (02-03-22 @ 08:30) (74 - 78)  BP: 129/75 (02-03-22 @ 08:30) (112/70 - 129/75)  RR: 16 (02-03-22 @ 08:30) (16 - 16)  SpO2: 100% (02-03-22 @ 08:30) (100% - 100%)    PHYSICAL EXAM:  General: NAD, elderly   HEENT: Normocephalic, atraumatic, PERLLA, EOMI bilateral, moist mucous membranes   Neck: Supple, nontender, no mass  Neurology: Alert and oriented x2-3, nonfocal, sensation intact   Respiratory: Clear to auscultation bilateral, no wheezing, rales or ronchi, on room air   Cardio: S1,S2, no murmurs, rubs or gallops  Abdominal: Soft, non-tender, non-distended bowel sounds present x4, no palpable masses  Extremities: No clubbing, cyanosis or edema, peripheral pulses present  MSK: Normal range of motion, no joint erythema or warmth, no joint swelling   Heme: No obvious ecchymosis or petechiae   Skin: warm, dry, normal color

## 2022-02-03 NOTE — ED ADULT NURSE NOTE - ABDOMEN
Chief complaint:   Chief Complaint   Patient presents with   • Mouth/Lip Problem     bump on the roof of her mouth        Vitals:  Visit Vitals   • Pulse 136   • Temp 97.8 °F (36.6 °C)   • Resp 26   • Ht 30.32\" (77 cm)   • Wt 9.667 kg   • BMI 16.31 kg/m2       HISTORY OF PRESENT ILLNESS     HPI Comments: She fell forward and bumped her mouth yesterday. Dad noticed a big brown spot on the roof of her mouth. It does not seem painful. She has had a white spot on her upper gum for the past 2 months.     Mouth/Lip Problem Primary symptoms do not include fever or cough.       Other significant problems:  Patient Active Problem List    Diagnosis Date Noted   • Dacryostenosis of right nasolacrimal duct 2016     Priority: Low       PAST MEDICAL, FAMILY AND SOCIAL HISTORY     Medications:  No current outpatient prescriptions on file.     No current facility-administered medications for this visit.        Allergies:  ALLERGIES:  No Known Allergies    Past Medical  History/Surgeries:  History reviewed. No pertinent past medical history.    History reviewed. No pertinent surgical history.    Family History:  History reviewed. No pertinent family history.    Social History:  Social History   Substance Use Topics   • Smoking status: Never Smoker   • Smokeless tobacco: Never Used   • Alcohol use Not on file       REVIEW OF SYSTEMS     Review of Systems   Constitutional: Negative for activity change, appetite change, crying, fever and irritability.   HENT: Negative for congestion and rhinorrhea.         No pulling on ears   Respiratory: Negative for cough.    Gastrointestinal: Negative for diarrhea and vomiting.   Genitourinary: Negative for decreased urine volume.   Skin: Negative for rash.       PHYSICAL EXAM     Physical Exam   Constitutional: She appears well-developed and well-nourished. She is active.  Non-toxic appearance. No distress.   HENT:   Head: Anterior fontanelle is flat.   Right Ear: External ear, pinna and  canal normal. No tenderness. Tympanic membrane is normal.   Left Ear: External ear, pinna and canal normal. No tenderness. Tympanic membrane is normal.   Nose: No nasal deformity.   Mouth/Throat: Mucous membranes are moist. No oral lesions.       Nares patent   Eyes: Conjunctivae and lids are normal. Right eye exhibits no erythema. Left eye exhibits no erythema.   Neck: Neck supple.   Cardiovascular: Normal rate, regular rhythm, S1 normal and S2 normal.  Exam reveals no S3, no S4 and no friction rub.  Pulses are strong.    No murmur heard.  Pulmonary/Chest: Effort normal and breath sounds normal. No accessory muscle usage, nasal flaring or stridor. No respiratory distress. Air movement is not decreased. She has no wheezes. She has no rhonchi. She has no rales. She exhibits no retraction.   Abdominal: Soft. She exhibits no distension and no mass. There is no hepatosplenomegaly. There is no tenderness.   Musculoskeletal: Normal range of motion.   Neurological: She is alert.   Skin: Skin is warm and moist. Capillary refill takes less than 3 seconds. Turgor is turgor normal. No rash noted. No cyanosis. No jaundice.       ASSESSMENT/PLAN     (R22.0) Palate mass  (primary encounter diagnosis)  Comment: of unclear origin, not painful or bleeding  Plan: discussed with ENT at [facility]. They were unsure of etiology as well. Will f/u there tomorrow for evaluation. Advised parents to monitor it closely and f/u if there is any bleeding or change.        soft

## 2022-02-03 NOTE — ED PROVIDER NOTE - NSICDXPASTMEDICALHX_GEN_ALL_CORE_FT
PAST MEDICAL HISTORY:  Basal cell carcinoma face, left leg and back)    Bronchitis 1 month ago txted with Z-josesito    COPD (chronic obstructive pulmonary disease) currently not taking any medications    HTN (hypertension)     Osteoarthrosis of hip and thigh region right

## 2022-02-03 NOTE — ED PROVIDER NOTE - PROGRESS NOTE DETAILS
d/w ortho pa, they relates candi (spine) already d/w juliette (hospitalist) who will be admitting pt, relates needs clearances for kypho tomorrow. d/w juan c (hospitalist) he was already aware of pt, will admit. d/w barbara (cards) and rocio (pulm) will see pt

## 2022-02-03 NOTE — H&P ADULT - ASSESSMENT
77 yo F HTN, SVT, COPD, basal cell carcinoma presenting for elective kyphoplasty with orthopedics.     #Kyphoplasty  Planned for tomorrow with orthopedics  Neuro, pulm and cardio input appreciated  CT head pending  Patient medically optimized at intermediate risk for planned orthopedic procedure with routine hemodynamic monitoring    #COPD  Continue inhalers    #CAD  Continue statin    #Hypokalemia  Potassium supplemented    #Hx of SVT  Continue Metoprolol    #HTN  Continue Amlodipine    #DVT ppx  Per ortho   77 yo F HTN, SVT, COPD, basal cell carcinoma presenting for elective kyphoplasty with orthopedics.     #Kyphoplasty  Planned for tomorrow with orthopedics  Neuro, pulm and cardio input appreciated  CT head pending  Patient medically optimized at intermediate risk for planned orthopedic procedure with routine hemodynamic monitoring    #COPD  Continue inhalers    #CAD  Continue statin  Patient notes she stopped taking statin at home because she "doesn't like to take medications"     #Hypokalemia  Potassium supplemented    #Hx of SVT  Continue Metoprolol    #HTN  Continue Amlodipine    #DVT ppx  Per ortho

## 2022-02-03 NOTE — PATIENT PROFILE ADULT - FALL HARM RISK - HARM RISK INTERVENTIONS

## 2022-02-03 NOTE — CONSULT NOTE ADULT - ASSESSMENT
pt with back pain - OP - OA - HTN - COPD - Ex Smoker -     now eval for Ortho Spine - Kyphoplasty  I alice  pain rx regimen  bowel rx regimen  neuro exam  tolerating RA - Vs noted  CXR noted -   dvt p   Proventil PRN for sob and or wheezing  will need PFT as outpatient  seasonal vaccination   
The patient is a 76 year old female with a history of HTN, HL, COPD, SDH, SVT who presents with back pain, for kyphoplasty.     Plan:  - ECG with no evidence of ischemia or infarction  - HTN - continue amlodipine 5 mg daily. Hold HCTZ for now.  - History of SVT - continue metoprolol succinate 25 mg daily  - Echo 11/20 with normal LV systolic function, no significant valve issues  - Continue atorvastatin 10 mg daily  - No evidence of decompensated heart failure on exam  - There are no active cardiac issues. The patient is at intermediate risk for cardiac events for an intermediate risk surgery. She is optimized to proceed without additional cardiac testing.
76 year old female with a history of HTN, HL, COPD, SDH, SVT who presents with back pain, for kyphoplasty.  C/o Mild Low back pain now when not moving, radiates to back of her left thigh upto knee  H/o SDH  No Focal weakness.  Would get CT Head  -If No acute bleed  -Would clear for orthopedic procedure/Kyphoplasty.  Pt does seem to have poor cognition.  D/w Dr. Connolly.  Would follow.

## 2022-02-03 NOTE — CONSULT NOTE ADULT - SUBJECTIVE AND OBJECTIVE BOX
Patient is a 76y old  Female who presents with a chief complaint of     History of Present Illness: The patient is a 76 year old female with a history of HTN, HL, COPD, SDH, SVT who presents with back pain, for kyphoplasty. The patient is a poor historian regarding her prior history. She has no complaints of chest pain or shortness of breath. As per notes from outpatient cardiologist, she previously had fall and syncope causing SDH. She underwent event monitoring showing SVT.     PAST MEDICAL & SURGICAL HISTORY:    HTN (hypertension)    Basal cell carcinoma  face, left leg and back)    Osteoarthrosis of hip and thigh region  right    COPD (chronic obstructive pulmonary disease)  currently not taking any medications    Bronchitis  1 month ago txted with Z-josesito    Hx of appendectomy  @ 14 yrs old    Benign breast lumps  bilateral - 15 yrs ago    Hx of cataract surgery  bilateral - 2007 IOL    Status post eye surgery  laser left eye - 05/2013    MEDICATIONS  (STANDING):     potassium chloride    Tablet ER 40 milliEquivalent(s) Oral once    MEDICATIONS  (PRN):  acetaminophen     Tablet .. 650 milliGRAM(s) Oral every 6 hours PRN Temp greater or equal to 38C (100.4F), Mild Pain (1 - 3)  ALBUTerol    90 MICROgram(s) HFA Inhaler 2 Puff(s) Inhalation every 6 hours PRN Shortness of Breath and/or Wheezing  bisacodyl 5 milliGRAM(s) Oral every 12 hours PRN Constipation      Allergies    No Known Allergies    SOCIAL HISTORY:    No h/o Smoking.   No h/o alcohol use.    FAMILY HISTORY: N/C as per chart    REVIEW OF SYSTEMS:    CONSTITUTIONAL: No fever  EYES: No eye pain,   ENMT:  No sinus or throat pain  NECK: No pain or stiffness  RESPIRATORY: No cough, No hemoptysis; No shortness of breath  CARDIOVASCULAR: No acute chest pain, palpitations,  or leg swelling  GASTROINTESTINAL: No abdominal pain. No nausea, vomiting, or hematemesis;  No melena or hematochezia.  GENITOURINARY: No  hematuria, or incontinence  MUSCULOSKELETAL: No joint swelling; No extremity pain  SKIN: No itching, rashes, or lesions   LYMPH NODES: No enlarged glands  NEUROLOGICAL: No headaches, memory loss,   PSYCHIATRIC: No depression, anxiety, mood swings, or difficulty sleeping  ENDOCRINE: No heat or cold intolerance;   HEME/LYMPH: No easy bruising, or bleeding gums  Allergy/Immunology. No medication allergy. No seasonal allergies.    PHYSICAL EXAM:  Vital Signs Last 24 Hrs  T(F): 97.9 (02-03-22 @ 07:56)  HR: 78 (02-03-22 @ 08:30)  BP: 129/75 (02-03-22 @ 08:30)  RR: 16 (02-03-22 @ 08:30)    GENERAL: NAD, well-groomed, well-developed  HEAD:  Atraumatic, Normocephalic  EYES: EOMI, PERRLA, conjunctiva and sclera clear  NECK: Supple, No JVD, thyroid non-palpable    On Neurological Examination:    Mental Status - Pt is alert, awake, oriented X3. Higher functions are intact. Pt. does have mild poor cognition. Follows commands well and able to answer questions appropriately.    Speech -  Normal. Slurred. Pt has no aphasia.    Cranial Nerves - Pupils 3 mm equal and reactive to light, extraocular eye movements intact. Pt has no visual field deficit.  Pt has no right left facial asymmetry. Tongue - is in midline.    Motor Exam - 4 plus/5 all over, No drift. No shaking or tremors.  Muscle tone - is normal all over. Moves all extremities equally. No asymmetry is seen.      Sensory Exam - Pin prick, temperature, joint position and vibration are intact on either side. Pt withdraws all extremities equally on stimulation. No asymmetry seen. No complaints of tingling, numbness.    Gait - Able to stand and walk unassisted. Pt is able to stand up with holding my hands and is able to walk for few feet around the bed. Not falling to either side.    Deep tendon Reflexes - 2 plus all over.    Coordination - Fine finger movements are normal on both sides. Finger to nose is also normal on both sides.       Romberg - Negative.    Neck Supple -  Yes.    LABS:                        12.9   5.43  )-----------( 199      ( 03 Feb 2022 08:34 )             39.0     02-03    139  |  104  |  14  ----------------------------<  115<H>  3.0<L>   |  31  |  0.95    Ca    9.3      03 Feb 2022 08:34    TPro  6.8  /  Alb  3.3  /  TBili  0.9  /  DBili  x   /  AST  16  /  ALT  18  /  AlkPhos  102  02-03    PT/INR - ( 03 Feb 2022 08:34 )   PT: 12.9 sec;   INR: 1.07 ratio         PTT - ( 03 Feb 2022 08:34 )  PTT:28.4 sec      RADIOLOGY & ADDITIONAL STUDIES:       Patient is a 76y old  Female who presents with a chief complaint of     History of Present Illness: The patient is a 76 year old female with a history of HTN, HL, COPD, SDH, SVT who presents with back pain, for kyphoplasty. The patient is a poor historian regarding her prior history. She has no complaints of chest pain or shortness of breath. As per notes from outpatient cardiologist, she previously had fall and syncope causing SDH. She underwent event monitoring showing SVT.   C/o Mild Low back pain now when not moving, radiates to back of her left thigh upto knee.   No other complaints.  No perineal or perianal abn sensation.    PAST MEDICAL & SURGICAL HISTORY:    HTN (hypertension)    Basal cell carcinoma  face, left leg and back)    Osteoarthrosis of hip and thigh region  right    COPD (chronic obstructive pulmonary disease)  currently not taking any medications    Bronchitis  1 month ago txted with Z-josesito    Hx of appendectomy  @ 14 yrs old    Benign breast lumps  bilateral - 15 yrs ago    Hx of cataract surgery  bilateral - 2007 IOL    Status post eye surgery  laser left eye - 05/2013    MEDICATIONS  (STANDING):     potassium chloride    Tablet ER 40 milliEquivalent(s) Oral once    MEDICATIONS  (PRN):  acetaminophen     Tablet .. 650 milliGRAM(s) Oral every 6 hours PRN Temp greater or equal to 38C (100.4F), Mild Pain (1 - 3)  ALBUTerol    90 MICROgram(s) HFA Inhaler 2 Puff(s) Inhalation every 6 hours PRN Shortness of Breath and/or Wheezing  bisacodyl 5 milliGRAM(s) Oral every 12 hours PRN Constipation      Allergies    No Known Allergies    SOCIAL HISTORY:    Ex Smoker.   No h/o alcohol use.    FAMILY HISTORY: N/C as per chart    REVIEW OF SYSTEMS: UTO sec to poor cognition     PHYSICAL EXAM:  Vital Signs Last 24 Hrs  T(F): 97.9 (02-03-22 @ 07:56)  HR: 78 (02-03-22 @ 08:30)  BP: 129/75 (02-03-22 @ 08:30)  RR: 16 (02-03-22 @ 08:30)    GENERAL: NAD, well-groomed, well-developed  HEAD:  Atraumatic, Normocephalic  EYES: EOMI, PERRLA, conjunctiva and sclera clear  NECK: Supple, No JVD, thyroid non-palpable    On Neurological Examination:    Mental Status - Pt is alert, awake, Poor cognition. Follows commands.    Speech -  Normal. Pt has no aphasia.    Cranial Nerves - Pupils 3 mm equal and reactive to light, extraocular eye movements intact. Pt has no visual field deficit.  No facial asymmetry. Tongue - is in midline.    Motor Exam - 4/5 all over, No drift. No shaking or tremors.    Sensory Exam - Pt withdraws all extremities equally on stimulation.    Gait - Not tested    Deep tendon Reflexes - 2 plus all over.    Coordination - Fine finger movements and Finger to nose are normal on both sides.       Neck Supple -  Yes.    LABS:                        12.9   5.43  )-----------( 199      ( 03 Feb 2022 08:34 )             39.0     02-03    139  |  104  |  14  ----------------------------<  115<H>  3.0<L>   |  31  |  0.95    Ca    9.3      03 Feb 2022 08:34    TPro  6.8  /  Alb  3.3  /  TBili  0.9  /  DBili  x   /  AST  16  /  ALT  18  /  AlkPhos  102  02-03    PT/INR - ( 03 Feb 2022 08:34 )   PT: 12.9 sec;   INR: 1.07 ratio      PTT - ( 03 Feb 2022 08:34 )  PTT:28.4 sec    RADIOLOGY & ADDITIONAL STUDIES:

## 2022-02-03 NOTE — CONSULT NOTE ADULT - SUBJECTIVE AND OBJECTIVE BOX
History of Present Illness: The patient is a 76 year old female with a history of HTN, HL, COPD, SDH, SVT who presents with back pain, for kyphoplasty. The patient is a poor historian regarding her prior history. She has no complaints of chest pain or shortness of breath. As per notes from outpatient cardiologist, she previously had fall and syncope causing SDH. She underwent event monitoring showing SVT. An echo was done 11/20 with normal LV systolic function.    Past Medical/Surgical History:  HTN, HL, COPD, SDH, SVT    Medications:  Amlodipine 5 mg daily  HCTZ 12.5 mg daily  Metoprolol succinate 25 mg daily  Atorvastatin 10 mg daily      Family History: Non-contributory family history of premature cardiovascular atherosclerotic disease    Social History: Former tobacco use    Review of Systems:  General: No fevers, chills, weight gain  Skin: No rashes, color changes  Cardiovascular: No chest pain, orthopnea  Respiratory: No shortness of breath, cough  Gastrointestinal: No nausea, abdominal pain  Genitourinary: No incontinence, pain with urination  Musculoskeletal: No pain, swelling, decreased range of motion  Neurological: No headache, weakness  Psychiatric: No depression, anxiety  Endocrine: No weight gain, increased thirst  All other systems are comprehensively negative.    Physical Exam:  Vitals:        Vital Signs Last 24 Hrs  T(C): 36.6 (03 Feb 2022 07:56), Max: 36.6 (03 Feb 2022 07:56)  T(F): 97.9 (03 Feb 2022 07:56), Max: 97.9 (03 Feb 2022 07:56)  HR: 78 (03 Feb 2022 08:30) (74 - 78)  BP: 129/75 (03 Feb 2022 08:30) (112/70 - 129/75)  BP(mean): --  RR: 16 (03 Feb 2022 08:30) (16 - 16)  SpO2: 100% (03 Feb 2022 08:30) (100% - 100%)  General: NAD  HEENT: MMM  Neck: No JVD, no carotid bruit  Lungs: CTAB  CV: RRR, nl S1/S2, no M/R/G  Abdomen: S/NT/ND, +BS  Extremities: No LE edema, no cyanosis  Neuro: AAOx3, non-focal  Skin: No rash    Labs:                        12.9   5.43  )-----------( 199      ( 03 Feb 2022 08:34 )             39.0     02-03    139  |  104  |  14  ----------------------------<  115<H>  3.0<L>   |  31  |  0.95    Ca    9.3      03 Feb 2022 08:34    TPro  6.8  /  Alb  3.3  /  TBili  0.9  /  DBili  x   /  AST  16  /  ALT  18  /  AlkPhos  102  02-03        PT/INR - ( 03 Feb 2022 08:34 )   PT: 12.9 sec;   INR: 1.07 ratio         PTT - ( 03 Feb 2022 08:34 )  PTT:28.4 sec    ECG: NSR, normal axis, nonspecific ST abnormality

## 2022-02-03 NOTE — H&P ADULT - NSHPREVIEWOFSYSTEMS_GEN_ALL_CORE
Unable to obtain meaningful ROS due to mental status Patient limited historian but denies any new or acute complaints

## 2022-02-03 NOTE — PROGRESS NOTE ADULT - SUBJECTIVE AND OBJECTIVE BOX
Ortho Preop Note    Patient is a 76y old  Female who presents with a chief complaint of back pain  Diagnosis: Dorsalgia  Procedure: Kyphoplasty  Surgeon: YOLANDA Bryan                          12.9   5.43  )-----------( 199      ( 03 Feb 2022 08:34 )             39.0     02-03    139  |  104  |  14  ----------------------------<  115<H>  3.0<L>   |  31  |  0.95    Ca    9.3      03 Feb 2022 08:34    TPro  6.8  /  Alb  3.3  /  TBili  0.9  /  DBili  x   /  AST  16  /  ALT  18  /  AlkPhos  102  02-03    PT/INR - ( 03 Feb 2022 08:34 )   PT: 12.9 sec;   INR: 1.07 ratio         PTT - ( 03 Feb 2022 08:34 )  PTT:28.4 sec         Assessment & Plan:  76y.o Female with dorsalgia  -For kyphoplasty tomorrow  Type & Screen on chart  Chest X-ray on chart  EKG on chart  NPO/IVF after midnight  Medical clearance pending  Cardiology, Pulmonary, and Neurology clearances on chart  PAS for DVT prophylaxis       Ortho Preop Note    Patient is a 76y old  Female who presents with a chief complaint of back pain  Diagnosis: Dorsalgia  Procedure: Kyphoplasty  Surgeon: YOLANDA Bryan                          12.9   5.43  )-----------( 199      ( 03 Feb 2022 08:34 )             39.0     02-03    139  |  104  |  14  ----------------------------<  115<H>  3.0<L>   |  31  |  0.95    Ca    9.3      03 Feb 2022 08:34    TPro  6.8  /  Alb  3.3  /  TBili  0.9  /  DBili  x   /  AST  16  /  ALT  18  /  AlkPhos  102  02-03    PT/INR - ( 03 Feb 2022 08:34 )   PT: 12.9 sec;   INR: 1.07 ratio         PTT - ( 03 Feb 2022 08:34 )  PTT:28.4 sec         Assessment & Plan:  76y.o Female with dorsalgia  -For kyphoplasty tomorrow  Type & Screen on chart  Chest X-ray on chart  EKG on chart  NPO/IVF after midnight  Medical clearance on chart  Cardiology, Pulmonary, and Neurology clearances on chart  PAS for DVT prophylaxis

## 2022-02-03 NOTE — CONSULT NOTE ADULT - SUBJECTIVE AND OBJECTIVE BOX
Date/Time Patient Seen:  		  Referring MD:   Data Reviewed	       Patient is a 76y old  Female who presents with a chief complaint of     Subjective/HPI  vs noted  labs reviewed  er provider note reviewed  imaging reviewed  planned for Spine - Ortho Surgery leann  has known COPD - ex smoker - on RA - does not follow with Medicine or Pulm MD -   lives alone  retired  has a sister and niece - family  ros - back pain       Patient Identity:  · Birth Sex	Female     Child Abuse Assessment (patients less than 13 yrs):  Chief Complaint: pain, back.    · Chief Complaint: The patient is a 76y Female complaining of pain, back.  · HPI Objective Statement: pt sent by spine for admission for kyphoplasty tomorrow. pt relates hx back pain since fall in april 2021. denies weakness, numbness, incontinence, cp, sob, abd pain, urinary complaints.  	pmd - forgot name  spine - candi  · Presenting Symptoms: PAIN  · Negative Findings: no bladder dysfunction, no bowel dysfunction, no motor function loss, no numbness  · Location: back  · Area: lower  lumbar  · Timing: constant  · Duration: month(s)  · Context: fall  · Aggravated Factors: positional change  · Relieving Factors: none       PAST MEDICAL & SURGICAL HISTORY:  HTN (hypertension)    Basal cell carcinoma  face, left leg and back)    Osteoarthrosis of hip and thigh region  right    COPD (chronic obstructive pulmonary disease)  currently not taking any medications    Bronchitis  1 month ago txted with Z-josesito    Hx of appendectomy  @ 14 yrs old    Benign breast lumps  bilateral - 15 yrs ago    Hx of cataract surgery  bilateral - 2007 IOL    Status post eye surgery  laser left eye - 05/2013          Medication list         MEDICATIONS  (STANDING):  potassium chloride    Tablet ER 40 milliEquivalent(s) Oral once    MEDICATIONS  (PRN):         Vitals log        ICU Vital Signs Last 24 Hrs  T(C): 36.6 (03 Feb 2022 07:56), Max: 36.6 (03 Feb 2022 07:56)  T(F): 97.9 (03 Feb 2022 07:56), Max: 97.9 (03 Feb 2022 07:56)  HR: 78 (03 Feb 2022 08:30) (74 - 78)  BP: 129/75 (03 Feb 2022 08:30) (112/70 - 129/75)  BP(mean): --  ABP: --  ABP(mean): --  RR: 16 (03 Feb 2022 08:30) (16 - 16)  SpO2: 100% (03 Feb 2022 08:30) (100% - 100%)           Input and Output:  I&O's Detail      Lab Data                        12.9   5.43  )-----------( 199      ( 03 Feb 2022 08:34 )             39.0     02-03    139  |  104  |  14  ----------------------------<  115<H>  3.0<L>   |  31  |  0.95    Ca    9.3      03 Feb 2022 08:34    TPro  6.8  /  Alb  3.3  /  TBili  0.9  /  DBili  x   /  AST  16  /  ALT  18  /  AlkPhos  102  02-03            Review of Systems	  pain  weakness      Objective     Physical Examination  heart s1s2  lung dec BS  abd soft  head nc  verbal  alert  cn grossly int        Pertinent Lab findings & Imaging      Lawler:  NO   Adequate UO     I&O's Detail           Discussed with:     Cultures:	        Radiology      EXAM:  MR BRAIN        PROCEDURE DATE:  11/30/2021           INTERPRETATION:  Clinical indications: Previous trauma. Previous infarct.    MRI of brain was performed using sagittal T1, axial T1 T2 T2 FLAIR diffusion and susceptibility weighted sequence. Coronal T1-weighted sequence was performed as well.    This exam is compared with prior MRI brain performed on March 4, 2021.    Extensive parenchymal volume loss and chronic microvascular ischemic changes are again seen.    There is evidence of area of encephalomalacia and gliosis seen involving the left temporal region. This appears unchanged when compared with the prior exam. Ex vacuo dilatation of the left temporal horn is again seen and unchanged.    There is no evidence acute hemorrhage mass or mass effect seen.    The large vessels demonstrate normal flow voids    The patient is status post bilateral cataract surgery.    The visualized paranasal sinuses appear clear.    Both mastoid and and middle ear regions appear clear.    IMPRESSION: Stable exam.    --- End of Report ---               KIMBERLEY ESCOBAR MD; Attending Radiologist   This document has been electronically signed. Dec  7 2021 10:26AM        CXR  grossly clear - prelim  official report pending

## 2022-02-03 NOTE — ED PROVIDER NOTE - NSICDXPASTSURGICALHX_GEN_ALL_CORE_FT
PAST SURGICAL HISTORY:  Benign breast lumps bilateral - 15 yrs ago    Hx of appendectomy @ 14 yrs old    Hx of cataract surgery bilateral - 2007 IOL    Status post eye surgery laser left eye - 05/2013

## 2022-02-03 NOTE — H&P ADULT - HISTORY OF PRESENT ILLNESS
77 yo F HTN, SVT, COPD, basal cell carcinoma presenting for elective kyphoplasty with orthopedics. Patient is a limited historian but relates she had a fall in April but unable to provide further information. Patient denies any other complaints. Denies headaches, dizziness, fevers, chest pain, palpitations, nausea, vomiting.      In the ED: vss, cbc wnl, potassium 3.0.

## 2022-02-04 ENCOUNTER — RESULT REVIEW (OUTPATIENT)
Age: 77
End: 2022-02-04

## 2022-02-04 LAB
ALBUMIN SERPL ELPH-MCNC: 3 G/DL — LOW (ref 3.3–5)
ALP SERPL-CCNC: 92 U/L — SIGNIFICANT CHANGE UP (ref 30–120)
ALT FLD-CCNC: 16 U/L DA — SIGNIFICANT CHANGE UP (ref 10–60)
ANION GAP SERPL CALC-SCNC: 8 MMOL/L — SIGNIFICANT CHANGE UP (ref 5–17)
AST SERPL-CCNC: 13 U/L — SIGNIFICANT CHANGE UP (ref 10–40)
BASOPHILS # BLD AUTO: 0.04 K/UL — SIGNIFICANT CHANGE UP (ref 0–0.2)
BASOPHILS NFR BLD AUTO: 0.8 % — SIGNIFICANT CHANGE UP (ref 0–2)
BILIRUB SERPL-MCNC: 0.7 MG/DL — SIGNIFICANT CHANGE UP (ref 0.2–1.2)
BUN SERPL-MCNC: 12 MG/DL — SIGNIFICANT CHANGE UP (ref 7–23)
CALCIUM SERPL-MCNC: 8.4 MG/DL — SIGNIFICANT CHANGE UP (ref 8.4–10.5)
CHLORIDE SERPL-SCNC: 107 MMOL/L — SIGNIFICANT CHANGE UP (ref 96–108)
CO2 SERPL-SCNC: 29 MMOL/L — SIGNIFICANT CHANGE UP (ref 22–31)
CREAT SERPL-MCNC: 0.76 MG/DL — SIGNIFICANT CHANGE UP (ref 0.5–1.3)
EOSINOPHIL # BLD AUTO: 0.1 K/UL — SIGNIFICANT CHANGE UP (ref 0–0.5)
EOSINOPHIL NFR BLD AUTO: 1.9 % — SIGNIFICANT CHANGE UP (ref 0–6)
GLUCOSE SERPL-MCNC: 96 MG/DL — SIGNIFICANT CHANGE UP (ref 70–99)
HCT VFR BLD CALC: 37.5 % — SIGNIFICANT CHANGE UP (ref 34.5–45)
HGB BLD-MCNC: 12.3 G/DL — SIGNIFICANT CHANGE UP (ref 11.5–15.5)
IMM GRANULOCYTES NFR BLD AUTO: 0.4 % — SIGNIFICANT CHANGE UP (ref 0–1.5)
LYMPHOCYTES # BLD AUTO: 2.4 K/UL — SIGNIFICANT CHANGE UP (ref 1–3.3)
LYMPHOCYTES # BLD AUTO: 46.6 % — HIGH (ref 13–44)
MCHC RBC-ENTMCNC: 29 PG — SIGNIFICANT CHANGE UP (ref 27–34)
MCHC RBC-ENTMCNC: 32.8 GM/DL — SIGNIFICANT CHANGE UP (ref 32–36)
MCV RBC AUTO: 88.4 FL — SIGNIFICANT CHANGE UP (ref 80–100)
MONOCYTES # BLD AUTO: 0.38 K/UL — SIGNIFICANT CHANGE UP (ref 0–0.9)
MONOCYTES NFR BLD AUTO: 7.4 % — SIGNIFICANT CHANGE UP (ref 2–14)
NEUTROPHILS # BLD AUTO: 2.21 K/UL — SIGNIFICANT CHANGE UP (ref 1.8–7.4)
NEUTROPHILS NFR BLD AUTO: 42.9 % — LOW (ref 43–77)
NRBC # BLD: 0 /100 WBCS — SIGNIFICANT CHANGE UP (ref 0–0)
PLATELET # BLD AUTO: 187 K/UL — SIGNIFICANT CHANGE UP (ref 150–400)
POTASSIUM SERPL-MCNC: 3.4 MMOL/L — LOW (ref 3.5–5.3)
POTASSIUM SERPL-SCNC: 3.4 MMOL/L — LOW (ref 3.5–5.3)
PROT SERPL-MCNC: 5.9 G/DL — LOW (ref 6–8.3)
RBC # BLD: 4.24 M/UL — SIGNIFICANT CHANGE UP (ref 3.8–5.2)
RBC # FLD: 13.7 % — SIGNIFICANT CHANGE UP (ref 10.3–14.5)
SODIUM SERPL-SCNC: 144 MMOL/L — SIGNIFICANT CHANGE UP (ref 135–145)
WBC # BLD: 5.15 K/UL — SIGNIFICANT CHANGE UP (ref 3.8–10.5)
WBC # FLD AUTO: 5.15 K/UL — SIGNIFICANT CHANGE UP (ref 3.8–10.5)

## 2022-02-04 PROCEDURE — 99233 SBSQ HOSP IP/OBS HIGH 50: CPT

## 2022-02-04 PROCEDURE — 88304 TISSUE EXAM BY PATHOLOGIST: CPT | Mod: 26

## 2022-02-04 PROCEDURE — 88311 DECALCIFY TISSUE: CPT | Mod: 26

## 2022-02-04 PROCEDURE — 71045 X-RAY EXAM CHEST 1 VIEW: CPT | Mod: 26

## 2022-02-04 DEVICE — TAMP BONE KYPHON EXPANDER 10/2 1ST FX: Type: IMPLANTABLE DEVICE | Status: FUNCTIONAL

## 2022-02-04 DEVICE — SPONGE HSTAT SURGCEL FIBRILLAR 4X4IN: Type: IMPLANTABLE DEVICE | Status: FUNCTIONAL

## 2022-02-04 DEVICE — TRAY KYPHOPAK EXPRESS II 15/2: Type: IMPLANTABLE DEVICE | Status: FUNCTIONAL

## 2022-02-04 DEVICE — KYPHO PACK INFLATABLE BONE TAP: Type: IMPLANTABLE DEVICE | Status: FUNCTIONAL

## 2022-02-04 DEVICE — BONE CEMENT MIXURE W/CEMENT: Type: IMPLANTABLE DEVICE | Status: FUNCTIONAL

## 2022-02-04 DEVICE — TRAY KYPHOPAK EXPRESS FIRST FRACTURE: Type: IMPLANTABLE DEVICE | Status: FUNCTIONAL

## 2022-02-04 RX ORDER — HYDROMORPHONE HYDROCHLORIDE 2 MG/ML
0.5 INJECTION INTRAMUSCULAR; INTRAVENOUS; SUBCUTANEOUS
Refills: 0 | Status: DISCONTINUED | OUTPATIENT
Start: 2022-02-04 | End: 2022-02-05

## 2022-02-04 RX ORDER — ATORVASTATIN CALCIUM 80 MG/1
10 TABLET, FILM COATED ORAL AT BEDTIME
Refills: 0 | Status: DISCONTINUED | OUTPATIENT
Start: 2022-02-04 | End: 2022-02-05

## 2022-02-04 RX ORDER — HYDROMORPHONE HYDROCHLORIDE 2 MG/ML
0.5 INJECTION INTRAMUSCULAR; INTRAVENOUS; SUBCUTANEOUS
Refills: 0 | Status: DISCONTINUED | OUTPATIENT
Start: 2022-02-04 | End: 2022-02-04

## 2022-02-04 RX ORDER — SODIUM CHLORIDE 9 MG/ML
1000 INJECTION, SOLUTION INTRAVENOUS
Refills: 0 | Status: DISCONTINUED | OUTPATIENT
Start: 2022-02-04 | End: 2022-02-04

## 2022-02-04 RX ORDER — APREPITANT 80 MG/1
40 CAPSULE ORAL ONCE
Refills: 0 | Status: COMPLETED | OUTPATIENT
Start: 2022-02-04 | End: 2022-02-04

## 2022-02-04 RX ORDER — ONDANSETRON 8 MG/1
4 TABLET, FILM COATED ORAL ONCE
Refills: 0 | Status: DISCONTINUED | OUTPATIENT
Start: 2022-02-04 | End: 2022-02-04

## 2022-02-04 RX ORDER — OXYCODONE HYDROCHLORIDE 5 MG/1
5 TABLET ORAL
Refills: 0 | Status: DISCONTINUED | OUTPATIENT
Start: 2022-02-04 | End: 2022-02-05

## 2022-02-04 RX ORDER — OXYCODONE HYDROCHLORIDE 5 MG/1
10 TABLET ORAL
Refills: 0 | Status: DISCONTINUED | OUTPATIENT
Start: 2022-02-04 | End: 2022-02-05

## 2022-02-04 RX ORDER — AMLODIPINE BESYLATE 2.5 MG/1
5 TABLET ORAL DAILY
Refills: 0 | Status: DISCONTINUED | OUTPATIENT
Start: 2022-02-04 | End: 2022-02-05

## 2022-02-04 RX ORDER — METOPROLOL TARTRATE 50 MG
25 TABLET ORAL DAILY
Refills: 0 | Status: DISCONTINUED | OUTPATIENT
Start: 2022-02-04 | End: 2022-02-05

## 2022-02-04 RX ORDER — CEFAZOLIN SODIUM 1 G
2000 VIAL (EA) INJECTION EVERY 8 HOURS
Refills: 0 | Status: COMPLETED | OUTPATIENT
Start: 2022-02-05 | End: 2022-02-05

## 2022-02-04 RX ORDER — CEFAZOLIN SODIUM 1 G
2000 VIAL (EA) INJECTION ONCE
Refills: 0 | Status: COMPLETED | OUTPATIENT
Start: 2022-02-04 | End: 2022-02-04

## 2022-02-04 RX ORDER — OXYCODONE HYDROCHLORIDE 5 MG/1
5 TABLET ORAL ONCE
Refills: 0 | Status: DISCONTINUED | OUTPATIENT
Start: 2022-02-04 | End: 2022-02-04

## 2022-02-04 RX ORDER — GABAPENTIN 400 MG/1
100 CAPSULE ORAL THREE TIMES A DAY
Refills: 0 | Status: DISCONTINUED | OUTPATIENT
Start: 2022-02-04 | End: 2022-02-05

## 2022-02-04 RX ORDER — SODIUM CHLORIDE 9 MG/ML
1000 INJECTION, SOLUTION INTRAVENOUS
Refills: 0 | Status: DISCONTINUED | OUTPATIENT
Start: 2022-02-04 | End: 2022-02-05

## 2022-02-04 RX ADMIN — SODIUM CHLORIDE 75 MILLILITER(S): 9 INJECTION, SOLUTION INTRAVENOUS at 00:04

## 2022-02-04 RX ADMIN — APREPITANT 40 MILLIGRAM(S): 80 CAPSULE ORAL at 13:23

## 2022-02-04 RX ADMIN — GABAPENTIN 100 MILLIGRAM(S): 400 CAPSULE ORAL at 21:19

## 2022-02-04 RX ADMIN — Medication 25 MILLIGRAM(S): at 06:05

## 2022-02-04 NOTE — PRE-OP CHECKLIST - AS BP NONINV METHOD
[No studies available for review at this time] : No studies available for review at this time
electronic

## 2022-02-04 NOTE — PROGRESS NOTE ADULT - SUBJECTIVE AND OBJECTIVE BOX
Patient is a 76y old  Female who presents with a chief complaint of kyphoplasty (04 Feb 2022 09:11)      INTERVAL HPI/OVERNIGHT EVENTS: Patient seen and examined at bedside. No overnight events.      MEDICATIONS  (STANDING):  amLODIPine   Tablet 5 milliGRAM(s) Oral daily  atorvastatin 10 milliGRAM(s) Oral at bedtime  influenza  Vaccine (HIGH DOSE) 0.7 milliLiter(s) IntraMuscular once  metoprolol succinate ER 25 milliGRAM(s) Oral daily  sodium chloride 0.9% 1000 milliLiter(s) (75 mL/Hr) IV Continuous <Continuous>    MEDICATIONS  (PRN):  acetaminophen     Tablet .. 650 milliGRAM(s) Oral every 6 hours PRN Temp greater or equal to 38C (100.4F), Mild Pain (1 - 3)  ALBUTerol    90 MICROgram(s) HFA Inhaler 2 Puff(s) Inhalation every 6 hours PRN Shortness of Breath and/or Wheezing  bisacodyl 5 milliGRAM(s) Oral every 12 hours PRN Constipation      Allergies    No Known Allergies    Intolerances        REVIEW OF SYSTEMS: Patient limited historian but denies any new or acute complaints including the following.  CONSTITUTIONAL: No fever or chills  HEENT:  No headache, no sore throat  RESPIRATORY: No cough, wheezing, or shortness of breath  CARDIOVASCULAR: No chest pain, palpitations, or leg swelling  GASTROINTESTINAL: No abd pain, nausea, vomiting, or diarrhea  GENITOURINARY: No dysuria, frequency, or hematuria  NEUROLOGICAL: no focal weakness or dizziness  MUSCULOSKELETAL: no myalgias     Vital Signs Last 24 Hrs  T(C): 36.9 (04 Feb 2022 07:20), Max: 36.9 (03 Feb 2022 23:31)  T(F): 98.5 (04 Feb 2022 07:20), Max: 98.5 (03 Feb 2022 23:31)  HR: 63 (04 Feb 2022 07:20) (63 - 80)  BP: 115/62 (04 Feb 2022 07:20) (114/69 - 129/73)  BP(mean): --  RR: 16 (04 Feb 2022 07:20) (14 - 16)  SpO2: 96% (04 Feb 2022 07:20) (96% - 100%)    PHYSICAL EXAM:  General: NAD, elderly   HEENT: Normocephalic, atraumatic, PERLLA, EOMI bilateral, moist mucous membranes   Neurology: Alert and oriented x2-3, nonfocal, sensation intact   Respiratory: Clear to auscultation bilateral, no wheezing, rales or ronchi, on room air   Cardio: S1,S2, no murmurs, rubs or gallops  Abdominal: Soft, non-tender, non-distended bowel sounds present x4, no palpable masses  Extremities: No clubbing, cyanosis or edema, peripheral pulses present  MSK: Normal range of motion, no joint erythema or warmth, no joint swelling   Heme: No obvious ecchymosis or petechiae   Skin: warm, dry, normal color    LABS:                        12.3   5.15  )-----------( 187      ( 04 Feb 2022 06:25 )             37.5     CBC Full  -  ( 04 Feb 2022 06:25 )  WBC Count : 5.15 K/uL  Hemoglobin : 12.3 g/dL  Hematocrit : 37.5 %  Platelet Count - Automated : 187 K/uL  Mean Cell Volume : 88.4 fl  Mean Cell Hemoglobin : 29.0 pg  Mean Cell Hemoglobin Concentration : 32.8 gm/dL  Auto Neutrophil # : 2.21 K/uL  Auto Lymphocyte # : 2.40 K/uL  Auto Monocyte # : 0.38 K/uL  Auto Eosinophil # : 0.10 K/uL  Auto Basophil # : 0.04 K/uL  Auto Neutrophil % : 42.9 %  Auto Lymphocyte % : 46.6 %  Auto Monocyte % : 7.4 %  Auto Eosinophil % : 1.9 %  Auto Basophil % : 0.8 %    04 Feb 2022 06:22    144    |  107    |  12     ----------------------------<  96     3.4     |  29     |  0.76     Ca    8.4        04 Feb 2022 06:22    TPro  5.9    /  Alb  3.0    /  TBili  0.7    /  DBili  x      /  AST  13     /  ALT  16     /  AlkPhos  92     04 Feb 2022 06:22    PT/INR - ( 03 Feb 2022 08:34 )   PT: 12.9 sec;   INR: 1.07 ratio         PTT - ( 03 Feb 2022 08:34 )  PTT:28.4 sec    CAPILLARY BLOOD GLUCOSE              RADIOLOGY & ADDITIONAL TESTS: < from: CT Head No Cont (02.03.22 @ 11:34) >    ACC: [id number] EXAM:  CT BRAIN                          PROCEDURE DATE:  02/03/2022          INTERPRETATION:  Clinical indication: History subdural hematoma    Multiple axial sections were performed from base skull to vertex without   contrast enhanced. Coronal and sagittal reconstructions were performed.    This exam is compared with prior brain MRI performed on November 30, 2021.    Encephalomalacia and gliosis involving the left temporal region is again   seen. Exam vacuo dilatation of the left temporal horn is again seen.    Parenchymal volume loss and chronic microvascular ischemic changes again   seen.    There is no acute hemorrhage mass or mass effect seen.    Evaluation of the osseous structures with the appropriate window appears   unremarkable    The visualized paranasal sinuses mastoid and middle ear regions appear   clear.    The patient is status post bilateral cataract surgery and right-sided   scleral banding.    IMPRESSION: Encephalomalacia and gliosis is again seen as described above.    --- End of Report ---            KIMBERLEY ESCOBAR MD; Attending Radiologist  This document has been electronically signed. Feb  3 2022 12:59PM    < end of copied text >      Consultant(s) Notes Reviewed:  [x] YES  [ ] NO    Care Discussed with [x] Consultants  [x] Patient  [ ] Family  [ ]      [ x] Other; RN  DVT ppx

## 2022-02-04 NOTE — PHYSICAL THERAPY INITIAL EVALUATION ADULT - BED MOBILITY TRAINING, PT EVAL
Goals 3-5 sessions: Pt will be able to independently supine <-> sit via log roll Goals 3-5 sessions: Pt will be able to independently supine <-> sit via log roll after surgery

## 2022-02-04 NOTE — PHYSICAL THERAPY INITIAL EVALUATION ADULT - GAIT TRAINING, PT EVAL
Goal 3-5 sessions: pt will be able to independently ambulate 150 feet with rolling walker Goal 3-5 sessions: pt will be able to independently ambulate 150 feet with rolling walker after surgery

## 2022-02-04 NOTE — PHYSICAL THERAPY INITIAL EVALUATION ADULT - ADDITIONAL COMMENTS
Pt reports of being a poor historian. Pt lives alone in the 1st floor of apartment. Pt's sister lives on the 2nd floor. Pt has stairs to enter with rails. Pt does not have stairs inside her home. Pt owns a cane, walker, and rollator. Pt has a tub with bars attached to the walls. Pt reports of being a poor historian. Pt lives alone in the 1st floor of apartment. Pt's sister lives on the 2nd floor. Pt has stairs to enter with rails but unable to quantify number of stairs.  Pt does not have stairs inside her home. Pt owns a cane, walker, and rollator. Pt has a tub with bars attached to the walls.

## 2022-02-04 NOTE — PHYSICAL THERAPY INITIAL EVALUATION ADULT - TRANSFER TRAINING, PT EVAL
Goal 3-5 sessions: pt will be able to independently transfer with rolling walker Goal 3-5 sessions: pt will be able to independently transfer with rolling walker after surgery

## 2022-02-04 NOTE — PROGRESS NOTE ADULT - SUBJECTIVE AND OBJECTIVE BOX
Chief Complaint: Back pain    Interval Events: No events overnight.    Review of Systems:  General: No fevers, chills, weight gain  Skin: No rashes, color changes  Cardiovascular: No chest pain, orthopnea  Respiratory: No shortness of breath, cough  Gastrointestinal: No nausea, abdominal pain  Genitourinary: No incontinence, pain with urination  Musculoskeletal: No pain, swelling, decreased range of motion  Neurological: No headache, weakness  Psychiatric: No depression, anxiety  Endocrine: No weight gain, increased thirst  All other systems are comprehensively negative.    Physical Exam:  Vitals:        Vital Signs Last 24 Hrs  T(C): 36.9 (04 Feb 2022 07:20), Max: 36.9 (03 Feb 2022 23:31)  T(F): 98.5 (04 Feb 2022 07:20), Max: 98.5 (03 Feb 2022 23:31)  HR: 63 (04 Feb 2022 07:20) (63 - 80)  BP: 115/62 (04 Feb 2022 07:20) (114/69 - 129/73)  BP(mean): --  RR: 16 (04 Feb 2022 07:20) (14 - 16)  SpO2: 96% (04 Feb 2022 07:20) (96% - 100%)  General: NAD  HEENT: MMM  Neck: No JVD, no carotid bruit  Lungs: CTAB  CV: RRR, nl S1/S2, no M/R/G  Abdomen: S/NT/ND, +BS  Extremities: No LE edema, no cyanosis  Neuro: AAOx3, non-focal  Skin: No rash    Labs:                        12.3   5.15  )-----------( 187      ( 04 Feb 2022 06:25 )             37.5     02-04    144  |  107  |  12  ----------------------------<  96  3.4<L>   |  29  |  0.76    Ca    8.4      04 Feb 2022 06:22    TPro  5.9<L>  /  Alb  3.0<L>  /  TBili  0.7  /  DBili  x   /  AST  13  /  ALT  16  /  AlkPhos  92  02-04        PT/INR - ( 03 Feb 2022 08:34 )   PT: 12.9 sec;   INR: 1.07 ratio         PTT - ( 03 Feb 2022 08:34 )  PTT:28.4 sec

## 2022-02-04 NOTE — PROGRESS NOTE ADULT - SUBJECTIVE AND OBJECTIVE BOX
Patient is a 76y old  Female who presents with a chief complaint of     History of Present Illness: The patient is a 76 year old female with a history of HTN, HL, COPD, SDH, SVT who presents with back pain, for kyphoplasty. The patient is a poor historian regarding her prior history. She has no complaints of chest pain or shortness of breath. As per notes from outpatient cardiologist, she previously had fall and syncope causing SDH. She underwent event monitoring showing SVT.   C/o Mild Low back pain now when not moving, radiates to back of her left thigh upto knee.   No other complaints.  No perineal or perianal abn sensation.    Interval history -     No new complaints.    MEDICATIONS  (STANDING):    amLODIPine   Tablet 5 milliGRAM(s) Oral daily  atorvastatin 10 milliGRAM(s) Oral at bedtime  influenza  Vaccine (HIGH DOSE) 0.7 milliLiter(s) IntraMuscular once  metoprolol succinate ER 25 milliGRAM(s) Oral daily  sodium chloride 0.9% 1000 milliLiter(s) (75 mL/Hr) IV Continuous <Continuous>    MEDICATIONS  (PRN):    acetaminophen     Tablet .. 650 milliGRAM(s) Oral every 6 hours PRN Temp greater or equal to 38C (100.4F), Mild Pain (1 - 3)  ALBUTerol    90 MICROgram(s) HFA Inhaler 2 Puff(s) Inhalation every 6 hours PRN Shortness of Breath and/or Wheezing  bisacodyl 5 milliGRAM(s) Oral every 12 hours PRN Constipation    Allergies    No Known Allergies    REVIEW OF SYSTEMS: UTO sec to poor cognition     PHYSICAL EXAM:    Vital Signs Last 24 Hrs  T(C): 36.9 (04 Feb 2022 07:20), Max: 36.9 (03 Feb 2022 23:31)  T(F): 98.5 (04 Feb 2022 07:20), Max: 98.5 (03 Feb 2022 23:31)  HR: 63 (04 Feb 2022 07:20) (63 - 80)  BP: 115/62 (04 Feb 2022 07:20) (114/69 - 129/73)  RR: 16 (04 Feb 2022 07:20) (14 - 16)  SpO2: 96% (04 Feb 2022 07:20) (96% - 100%)    GENERAL: NAD, well-groomed, well-developed  HEAD:  Atraumatic, Normocephalic  EYES: EOMI, PERRLA, conjunctiva and sclera clear  NECK: Supple, No JVD, thyroid non-palpable    On Neurological Examination:    Mental Status - Pt is alert, awake, Poor cognition. Follows commands.    Speech -  Normal. Pt has no aphasia.    Cranial Nerves - Pupils 3 mm equal and reactive to light, extraocular eye movements intact. Pt has no visual field deficit.  No facial asymmetry. Tongue - is in midline.    Motor Exam - 4/5 all over, No drift. No shaking or tremors.    Sensory Exam - Pt withdraws all extremities equally on stimulation.    Gait - Not tested    Deep tendon Reflexes - 2 plus all over.    Coordination - Fine finger movements and Finger to nose are normal on both sides.       Neck Supple -  Yes.    LABS:             CBC Full  -  ( 04 Feb 2022 06:25 )  WBC Count : 5.15 K/uL  RBC Count : 4.24 M/uL  Hemoglobin : 12.3 g/dL  Hematocrit : 37.5 %  Platelet Count - Automated : 187 K/uL  Mean Cell Volume : 88.4 fl  Mean Cell Hemoglobin : 29.0 pg  Mean Cell Hemoglobin Concentration : 32.8 gm/dL  Auto Neutrophil # : 2.21 K/uL  Auto Lymphocyte # : 2.40 K/uL  Auto Monocyte # : 0.38 K/uL  Auto Eosinophil # : 0.10 K/uL  Auto Basophil # : 0.04 K/uL  Auto Neutrophil % : 42.9 %  Auto Lymphocyte % : 46.6 %  Auto Monocyte % : 7.4 %  Auto Eosinophil % : 1.9 %  Auto Basophil % : 0.8 %    02-04    144  |  107  |  12  ----------------------------<  96  3.4<L>   |  29  |  0.76    Ca    8.4      04 Feb 2022 06:22    TPro  5.9<L>  /  Alb  3.0<L>  /  TBili  0.7  /  DBili  x   /  AST  13  /  ALT  16  /  AlkPhos  92  02-04      RADIOLOGY & ADDITIONAL STUDIES:    < from: CT Head No Cont (02.03.22 @ 11:34) >    ACC: [id number] EXAM:  CT BRAIN                          PROCEDURE DATE:  02/03/2022      INTERPRETATION:  Clinical indication: History subdural hematoma    Multiple axial sections were performed from base skull to vertex without   contrast enhanced. Coronal and sagittal reconstructions were performed.    This exam is compared with prior brain MRI performed on November 30, 2021.    Encephalomalacia and gliosis involving the left temporal region is again   seen. Exam vacuo dilatation of the left temporal horn is again seen.    Parenchymal volume loss and chronic microvascular ischemic changes again   seen.    There is no acute hemorrhage mass or mass effect seen.    Evaluation of the osseous structures with the appropriate window appears   unremarkable    The visualized paranasal sinuses mastoid and middle ear regions appear   clear.    The patient is status post bilateral cataract surgery and right-sided   scleral banding.    IMPRESSION: Encephalomalacia and gliosis is again seen as described above.    --- End of Report ---    KIMBERLEY ESCOBAR MD; Attending Radiologist This document has been electronic    < end of copied text >

## 2022-02-04 NOTE — PROGRESS NOTE ADULT - SUBJECTIVE AND OBJECTIVE BOX
Date/Time Patient Seen:  		  Referring MD:   Data Reviewed	       Patient is a 76y old  Female who presents with a chief complaint of kyphoplasty (03 Feb 2022 11:23)      Subjective/HPI     PAST MEDICAL & SURGICAL HISTORY:  HTN (hypertension)    Basal cell carcinoma  face, left leg and back)    Osteoarthrosis of hip and thigh region  right    COPD (chronic obstructive pulmonary disease)  currently not taking any medications    Bronchitis  1 month ago txted with [id number]    Hx of appendectomy  @ 14 yrs old    Benign breast lumps  bilateral - 14 yrs ago    Hx of cataract surgery  bilateral - 2007 IOL    Status post eye surgery  laser left eye - 05/2013          Medication list         MEDICATIONS  (STANDING):  amLODIPine   Tablet 5 milliGRAM(s) Oral daily  atorvastatin 10 milliGRAM(s) Oral at bedtime  influenza  Vaccine (HIGH DOSE) 0.7 milliLiter(s) IntraMuscular once  lactated ringers. 1000 milliLiter(s) (75 mL/Hr) IV Continuous <Continuous>  metoprolol succinate ER 25 milliGRAM(s) Oral daily    MEDICATIONS  (PRN):  acetaminophen     Tablet .. 650 milliGRAM(s) Oral every 6 hours PRN Temp greater or equal to 38C (100.4F), Mild Pain (1 - 3)  ALBUTerol    90 MICROgram(s) HFA Inhaler 2 Puff(s) Inhalation every 6 hours PRN Shortness of Breath and/or Wheezing  bisacodyl 5 milliGRAM(s) Oral every 12 hours PRN Constipation         Vitals log        ICU Vital Signs Last 24 Hrs  T(C): 36.9 (03 Feb 2022 23:31), Max: 36.9 (03 Feb 2022 23:31)  T(F): 98.5 (03 Feb 2022 23:31), Max: 98.5 (03 Feb 2022 23:31)  HR: 66 (04 Feb 2022 06:09) (66 - 80)  BP: 122/66 (04 Feb 2022 06:09) (112/70 - 129/75)  BP(mean): --  ABP: --  ABP(mean): --  RR: 16 (03 Feb 2022 23:31) (14 - 16)  SpO2: 96% (03 Feb 2022 23:31) (96% - 100%)           Input and Output:  I&O's Detail      Lab Data                        12.9   5.43  )-----------( 199      ( 03 Feb 2022 08:34 )             39.0     02-03    139  |  104  |  14  ----------------------------<  115<H>  3.0<L>   |  31  |  0.95    Ca    9.3      03 Feb 2022 08:34    TPro  6.8  /  Alb  3.3  /  TBili  0.9  /  DBili  x   /  AST  16  /  ALT  18  /  AlkPhos  102  02-03            Review of Systems	      Objective     Physical Examination    heart s1s2  lung dec BS  abd soft      Pertinent Lab findings & Imaging      Nuris:  NO   Adequate UO     I&O's Detail           Discussed with:     Cultures:	        Radiology

## 2022-02-05 ENCOUNTER — TRANSCRIPTION ENCOUNTER (OUTPATIENT)
Age: 77
End: 2022-02-05

## 2022-02-05 VITALS
TEMPERATURE: 98 F | OXYGEN SATURATION: 98 % | DIASTOLIC BLOOD PRESSURE: 64 MMHG | RESPIRATION RATE: 16 BRPM | HEART RATE: 63 BPM | SYSTOLIC BLOOD PRESSURE: 121 MMHG

## 2022-02-05 LAB
ANION GAP SERPL CALC-SCNC: 7 MMOL/L — SIGNIFICANT CHANGE UP (ref 5–17)
BUN SERPL-MCNC: 19 MG/DL — SIGNIFICANT CHANGE UP (ref 7–23)
CALCIUM SERPL-MCNC: 8.3 MG/DL — LOW (ref 8.4–10.5)
CHLORIDE SERPL-SCNC: 106 MMOL/L — SIGNIFICANT CHANGE UP (ref 96–108)
CO2 SERPL-SCNC: 28 MMOL/L — SIGNIFICANT CHANGE UP (ref 22–31)
CREAT SERPL-MCNC: 0.94 MG/DL — SIGNIFICANT CHANGE UP (ref 0.5–1.3)
GLUCOSE SERPL-MCNC: 123 MG/DL — HIGH (ref 70–99)
HCT VFR BLD CALC: 36.3 % — SIGNIFICANT CHANGE UP (ref 34.5–45)
HGB BLD-MCNC: 11.8 G/DL — SIGNIFICANT CHANGE UP (ref 11.5–15.5)
MCHC RBC-ENTMCNC: 29.1 PG — SIGNIFICANT CHANGE UP (ref 27–34)
MCHC RBC-ENTMCNC: 32.5 GM/DL — SIGNIFICANT CHANGE UP (ref 32–36)
MCV RBC AUTO: 89.6 FL — SIGNIFICANT CHANGE UP (ref 80–100)
NRBC # BLD: 0 /100 WBCS — SIGNIFICANT CHANGE UP (ref 0–0)
PLATELET # BLD AUTO: 179 K/UL — SIGNIFICANT CHANGE UP (ref 150–400)
POTASSIUM SERPL-MCNC: 3.9 MMOL/L — SIGNIFICANT CHANGE UP (ref 3.5–5.3)
POTASSIUM SERPL-SCNC: 3.9 MMOL/L — SIGNIFICANT CHANGE UP (ref 3.5–5.3)
RBC # BLD: 4.05 M/UL — SIGNIFICANT CHANGE UP (ref 3.8–5.2)
RBC # FLD: 13.3 % — SIGNIFICANT CHANGE UP (ref 10.3–14.5)
SODIUM SERPL-SCNC: 141 MMOL/L — SIGNIFICANT CHANGE UP (ref 135–145)
WBC # BLD: 5.24 K/UL — SIGNIFICANT CHANGE UP (ref 3.8–10.5)
WBC # FLD AUTO: 5.24 K/UL — SIGNIFICANT CHANGE UP (ref 3.8–10.5)

## 2022-02-05 PROCEDURE — 97161 PT EVAL LOW COMPLEX 20 MIN: CPT

## 2022-02-05 PROCEDURE — C1713: CPT

## 2022-02-05 PROCEDURE — 88304 TISSUE EXAM BY PATHOLOGIST: CPT

## 2022-02-05 PROCEDURE — 97110 THERAPEUTIC EXERCISES: CPT

## 2022-02-05 PROCEDURE — 88311 DECALCIFY TISSUE: CPT

## 2022-02-05 PROCEDURE — 97165 OT EVAL LOW COMPLEX 30 MIN: CPT

## 2022-02-05 PROCEDURE — 99233 SBSQ HOSP IP/OBS HIGH 50: CPT

## 2022-02-05 PROCEDURE — 80053 COMPREHEN METABOLIC PANEL: CPT

## 2022-02-05 PROCEDURE — 97535 SELF CARE MNGMENT TRAINING: CPT

## 2022-02-05 PROCEDURE — C1726: CPT

## 2022-02-05 PROCEDURE — 86850 RBC ANTIBODY SCREEN: CPT

## 2022-02-05 PROCEDURE — 86900 BLOOD TYPING SEROLOGIC ABO: CPT

## 2022-02-05 PROCEDURE — 97164 PT RE-EVAL EST PLAN CARE: CPT

## 2022-02-05 PROCEDURE — 71045 X-RAY EXAM CHEST 1 VIEW: CPT

## 2022-02-05 PROCEDURE — 85025 COMPLETE CBC W/AUTO DIFF WBC: CPT

## 2022-02-05 PROCEDURE — 82803 BLOOD GASES ANY COMBINATION: CPT

## 2022-02-05 PROCEDURE — 85610 PROTHROMBIN TIME: CPT

## 2022-02-05 PROCEDURE — 85730 THROMBOPLASTIN TIME PARTIAL: CPT

## 2022-02-05 PROCEDURE — 97116 GAIT TRAINING THERAPY: CPT

## 2022-02-05 PROCEDURE — 76000 FLUOROSCOPY <1 HR PHYS/QHP: CPT

## 2022-02-05 PROCEDURE — 70450 CT HEAD/BRAIN W/O DYE: CPT

## 2022-02-05 PROCEDURE — 86901 BLOOD TYPING SEROLOGIC RH(D): CPT

## 2022-02-05 PROCEDURE — 36415 COLL VENOUS BLD VENIPUNCTURE: CPT

## 2022-02-05 PROCEDURE — 87635 SARS-COV-2 COVID-19 AMP PRB: CPT

## 2022-02-05 PROCEDURE — 97530 THERAPEUTIC ACTIVITIES: CPT

## 2022-02-05 PROCEDURE — 93005 ELECTROCARDIOGRAM TRACING: CPT

## 2022-02-05 PROCEDURE — 85027 COMPLETE CBC AUTOMATED: CPT

## 2022-02-05 PROCEDURE — 80048 BASIC METABOLIC PNL TOTAL CA: CPT

## 2022-02-05 PROCEDURE — 99285 EMERGENCY DEPT VISIT HI MDM: CPT | Mod: 25

## 2022-02-05 RX ORDER — GABAPENTIN 400 MG/1
1 CAPSULE ORAL
Qty: 0 | Refills: 0 | DISCHARGE
Start: 2022-02-05

## 2022-02-05 RX ORDER — OXYCODONE HYDROCHLORIDE 5 MG/1
1 TABLET ORAL
Qty: 30 | Refills: 0
Start: 2022-02-05

## 2022-02-05 RX ORDER — AMLODIPINE BESYLATE 2.5 MG/1
1 TABLET ORAL
Qty: 0 | Refills: 0 | DISCHARGE

## 2022-02-05 RX ORDER — METOPROLOL TARTRATE 50 MG
0 TABLET ORAL
Qty: 0 | Refills: 1 | DISCHARGE

## 2022-02-05 RX ORDER — ACETAMINOPHEN 500 MG
2 TABLET ORAL
Qty: 0 | Refills: 0 | DISCHARGE
Start: 2022-02-05

## 2022-02-05 RX ORDER — METOPROLOL TARTRATE 50 MG
1 TABLET ORAL
Qty: 0 | Refills: 0 | DISCHARGE
Start: 2022-02-05

## 2022-02-05 RX ORDER — ATORVASTATIN CALCIUM 80 MG/1
1 TABLET, FILM COATED ORAL
Qty: 0 | Refills: 0 | DISCHARGE
Start: 2022-02-05

## 2022-02-05 RX ORDER — AMLODIPINE BESYLATE 2.5 MG/1
1 TABLET ORAL
Qty: 0 | Refills: 0 | DISCHARGE
Start: 2022-02-05

## 2022-02-05 RX ORDER — ATORVASTATIN CALCIUM 80 MG/1
0 TABLET, FILM COATED ORAL
Qty: 0 | Refills: 1 | DISCHARGE

## 2022-02-05 RX ORDER — GABAPENTIN 400 MG/1
0 CAPSULE ORAL
Qty: 0 | Refills: 0 | DISCHARGE

## 2022-02-05 RX ORDER — ALBUTEROL 90 UG/1
2 AEROSOL, METERED ORAL
Qty: 0 | Refills: 0 | DISCHARGE
Start: 2022-02-05

## 2022-02-05 RX ADMIN — Medication 25 MILLIGRAM(S): at 05:48

## 2022-02-05 RX ADMIN — GABAPENTIN 100 MILLIGRAM(S): 400 CAPSULE ORAL at 13:31

## 2022-02-05 RX ADMIN — GABAPENTIN 100 MILLIGRAM(S): 400 CAPSULE ORAL at 05:48

## 2022-02-05 RX ADMIN — Medication 100 MILLIGRAM(S): at 01:00

## 2022-02-05 RX ADMIN — Medication 100 MILLIGRAM(S): at 09:06

## 2022-02-05 NOTE — PHYSICAL THERAPY INITIAL EVALUATION ADULT - RANGE OF MOTION EXAMINATION, REHAB EVAL
no ROM deficits were identified no ROM deficits were identified/bilateral upper extremity ROM was WFL (within functional limits)/bilateral lower extremity ROM was WFL (within functional limits)

## 2022-02-05 NOTE — DISCHARGE NOTE PROVIDER - CARE PROVIDERS DIRECT ADDRESSES
[email or web address]

## 2022-02-05 NOTE — PHYSICAL THERAPY INITIAL EVALUATION ADULT - CRITERIA FOR SKILLED THERAPEUTIC INTERVENTIONS
impairments found home with supervison/ast, RW, cane/anticipated equipment needs at discharge/anticipated discharge recommendation

## 2022-02-05 NOTE — DISCHARGE NOTE PROVIDER - NSDCFUADDAPPT_GEN_ALL_CORE_FT
call office for an appointment 7-10 days with Dr Bryan call office for an appointment 2 weeks days with Dr Bryan  schedule a GI consult

## 2022-02-05 NOTE — DISCHARGE NOTE PROVIDER - NSDCCPCAREPLAN_GEN_ALL_CORE_FT
PRINCIPAL DISCHARGE DIAGNOSIS  Diagnosis: Back pain  Assessment and Plan of Treatment:        PRINCIPAL DISCHARGE DIAGNOSIS  Diagnosis: Back pain  Assessment and Plan of Treatment: Seen by orthopedics and had lumbar kyphoplasty done. Please follow up with Dr. Bryan within 2 weeks of discharge. Continue with physical therapy and exercises as directed      SECONDARY DISCHARGE DIAGNOSES  Diagnosis: Abdominal pain with vomiting  Assessment and Plan of Treatment: You were noted to have episode of vomiting intra op. Please follow up with Dr. Cabrera within 1 week of discharge for further workup and managment

## 2022-02-05 NOTE — PROGRESS NOTE ADULT - SUBJECTIVE AND OBJECTIVE BOX
Patient is a 76y old  Female who presents with a chief complaint of     History of Present Illness: The patient is a 76 year old female with a history of HTN, HL, COPD, SDH, SVT who presents with back pain, for kyphoplasty. The patient is a poor historian regarding her prior history. She has no complaints of chest pain or shortness of breath. As per notes from outpatient cardiologist, she previously had fall and syncope causing SDH. She underwent event monitoring showing SVT.   C/o Mild Low back pain now when not moving, radiates to back of her left thigh upto knee.   No other complaints.  No perineal or perianal abn sensation.    Interval history -     Pain acceptable with pain meds.    MEDICATIONS  (STANDING):    amLODIPine   Tablet 5 milliGRAM(s) Oral daily  atorvastatin 10 milliGRAM(s) Oral at bedtime  gabapentin 100 milliGRAM(s) Oral three times a day  influenza  Vaccine (HIGH DOSE) 0.7 milliLiter(s) IntraMuscular once  metoprolol succinate ER 25 milliGRAM(s) Oral daily    MEDICATIONS  (PRN):    acetaminophen     Tablet .. 650 milliGRAM(s) Oral every 6 hours PRN Temp greater or equal to 38C (100.4F), Mild Pain (1 - 3)  ALBUTerol    90 MICROgram(s) HFA Inhaler 2 Puff(s) Inhalation every 6 hours PRN Shortness of Breath and/or Wheezing  bisacodyl 5 milliGRAM(s) Oral every 12 hours PRN Constipation  HYDROmorphone  Injectable 0.5 milliGRAM(s) IV Push every 3 hours PRN for breakthrough pain  oxyCODONE    IR 5 milliGRAM(s) Oral every 3 hours PRN Moderate Pain (4 - 6)  oxyCODONE    IR 10 milliGRAM(s) Oral every 3 hours PRN Severe Pain (7 - 10)    Allergies    No Known Allergies    REVIEW OF SYSTEMS: UTO sec to poor cognition     PHYSICAL EXAM:    Vital Signs Last 24 Hrs    T(C): 36.5 (05 Feb 2022 08:08), Max: 37.3 (05 Feb 2022 00:01)  T(F): 97.7 (05 Feb 2022 08:08), Max: 99.1 (05 Feb 2022 00:01)  HR: 63 (05 Feb 2022 08:08) (54 - 76)  BP: 121/64 (05 Feb 2022 08:08) (100/55 - 128/58)  RR: 16 (05 Feb 2022 08:08) (11 - 21)  SpO2: 98% (05 Feb 2022 08:08) (94% - 99%)    GENERAL: NAD, well-groomed, well-developed  HEAD:  Atraumatic, Normocephalic  EYES: EOMI, PERRLA, conjunctiva and sclera clear  NECK: Supple, No JVD, thyroid non-palpable    On Neurological Examination:    Mental Status - Pt is alert, awake, Poor cognition. Follows commands.    Speech -  Normal. Pt has no aphasia.    Cranial Nerves - Pupils 3 mm equal and reactive to light, extraocular eye movements intact. Pt has no visual field deficit.  No facial asymmetry. Tongue - is in midline.    Motor Exam - 4/5 all over, No drift. No shaking or tremors.    Sensory Exam - Pt withdraws all extremities equally on stimulation.    Gait - Not tested    Deep tendon Reflexes - 2 plus all over.    Coordination - Fine finger movements and Finger to nose are normal on both sides.       Neck Supple -  Yes.    LABS:    CBC Full  -  ( 05 Feb 2022 07:34 )  WBC Count : 5.24 K/uL  RBC Count : 4.05 M/uL  Hemoglobin : 11.8 g/dL  Hematocrit : 36.3 %  Platelet Count - Automated : 179 K/uL  Mean Cell Volume : 89.6 fl  Mean Cell Hemoglobin : 29.1 pg  Mean Cell Hemoglobin Concentration : 32.5 gm/dL    02-05    141  |  106  |  19  ----------------------------<  123<H>  3.9   |  28  |  0.94    Ca    8.3<L>      05 Feb 2022 07:34    TPro  5.9<L>  /  Alb  3.0<L>  /  TBili  0.7  /  DBili  x   /  AST  13  /  ALT  16  /  AlkPhos  92  02-04    RADIOLOGY & ADDITIONAL STUDIES:    < from: CT Head No Cont (02.03.22 @ 11:34) >    ACC: [id number] EXAM:  CT BRAIN                          PROCEDURE DATE:  02/03/2022      INTERPRETATION:  Clinical indication: History subdural hematoma    Multiple axial sections were performed from base skull to vertex without   contrast enhanced. Coronal and sagittal reconstructions were performed.    This exam is compared with prior brain MRI performed on November 30, 2021.    Encephalomalacia and gliosis involving the left temporal region is again   seen. Exam vacuo dilatation of the left temporal horn is again seen.    Parenchymal volume loss and chronic microvascular ischemic changes again   seen.    There is no acute hemorrhage mass or mass effect seen.    Evaluation of the osseous structures with the appropriate window appears   unremarkable    The visualized paranasal sinuses mastoid and middle ear regions appear   clear.    The patient is status post bilateral cataract surgery and right-sided   scleral banding.    IMPRESSION: Encephalomalacia and gliosis is again seen as described above.    --- End of Report ---    KIMBERLEY ESCOBAR MD; Attending Radiologist This document has been electronic    < end of copied text >

## 2022-02-05 NOTE — PROGRESS NOTE ADULT - SUBJECTIVE AND OBJECTIVE BOX
Date/Time Patient Seen:  		  Referring MD:   Data Reviewed	       Patient is a 76y old  Female who presents with a chief complaint of kyphoplasty (04 Feb 2022 09:38)      Subjective/HPI     PAST MEDICAL & SURGICAL HISTORY:  HTN (hypertension)    Basal cell carcinoma  face, left leg and back)    Osteoarthrosis of hip and thigh region  right    COPD (chronic obstructive pulmonary disease)  currently not taking any medications    Bronchitis  1 month ago txted with [id number]    Hx of appendectomy  @ 14 yrs old    Benign breast lumps  bilateral - 14 yrs ago    Hx of cataract surgery  bilateral - 2007 IOL    Status post eye surgery  laser left eye - 05/2013          Medication list         MEDICATIONS  (STANDING):  amLODIPine   Tablet 5 milliGRAM(s) Oral daily  atorvastatin 10 milliGRAM(s) Oral at bedtime  ceFAZolin   IVPB 2000 milliGRAM(s) IV Intermittent every 8 hours  gabapentin 100 milliGRAM(s) Oral three times a day  influenza  Vaccine (HIGH DOSE) 0.7 milliLiter(s) IntraMuscular once  metoprolol succinate ER 25 milliGRAM(s) Oral daily  sodium chloride 0.9% 1000 milliLiter(s) (75 mL/Hr) IV Continuous <Continuous>    MEDICATIONS  (PRN):  acetaminophen     Tablet .. 650 milliGRAM(s) Oral every 6 hours PRN Temp greater or equal to 38C (100.4F), Mild Pain (1 - 3)  ALBUTerol    90 MICROgram(s) HFA Inhaler 2 Puff(s) Inhalation every 6 hours PRN Shortness of Breath and/or Wheezing  bisacodyl 5 milliGRAM(s) Oral every 12 hours PRN Constipation  HYDROmorphone  Injectable 0.5 milliGRAM(s) IV Push every 3 hours PRN for breakthrough pain  oxyCODONE    IR 5 milliGRAM(s) Oral every 3 hours PRN Moderate Pain (4 - 6)  oxyCODONE    IR 10 milliGRAM(s) Oral every 3 hours PRN Severe Pain (7 - 10)         Vitals log        ICU Vital Signs Last 24 Hrs  T(C): 36.6 (05 Feb 2022 05:51), Max: 37.3 (05 Feb 2022 00:01)  T(F): 97.8 (05 Feb 2022 05:51), Max: 99.1 (05 Feb 2022 00:01)  HR: 61 (05 Feb 2022 05:51) (54 - 76)  BP: 112/64 (05 Feb 2022 05:51) (100/55 - 128/58)  BP(mean): --  ABP: --  ABP(mean): --  RR: 16 (05 Feb 2022 05:51) (11 - 21)  SpO2: 95% (05 Feb 2022 00:01) (94% - 99%)           Input and Output:  I&O's Detail    03 Feb 2022 07:01  -  04 Feb 2022 07:00  --------------------------------------------------------  IN:    Lactated Ringers: 600 mL  Total IN: 600 mL    OUT:  Total OUT: 0 mL    Total NET: 600 mL      04 Feb 2022 07:01  -  05 Feb 2022 06:40  --------------------------------------------------------  IN:    Lactated Ringers: 500 mL  Total IN: 500 mL    OUT:    Blood Loss (mL): 10 mL    Voided (mL): 25 mL  Total OUT: 35 mL    Total NET: 465 mL          Lab Data                        12.3   5.15  )-----------( 187      ( 04 Feb 2022 06:25 )             37.5     02-04    144  |  107  |  12  ----------------------------<  96  3.4<L>   |  29  |  0.76    Ca    8.4      04 Feb 2022 06:22    TPro  5.9<L>  /  Alb  3.0<L>  /  TBili  0.7  /  DBili  x   /  AST  13  /  ALT  16  /  AlkPhos  92  02-04            Review of Systems	      Objective     Physical Examination  heart s1s2  lung dec BS  abd soft        Pertinent Lab findings & Imaging      Nuris:  NO   Adequate UO     I&O's Detail    03 Feb 2022 07:01  -  04 Feb 2022 07:00  --------------------------------------------------------  IN:    Lactated Ringers: 600 mL  Total IN: 600 mL    OUT:  Total OUT: 0 mL    Total NET: 600 mL      04 Feb 2022 07:01  -  05 Feb 2022 06:40  --------------------------------------------------------  IN:    Lactated Ringers: 500 mL  Total IN: 500 mL    OUT:    Blood Loss (mL): 10 mL    Voided (mL): 25 mL  Total OUT: 35 mL    Total NET: 465 mL               Discussed with:     Cultures:	        Radiology

## 2022-02-05 NOTE — PROGRESS NOTE ADULT - SUBJECTIVE AND OBJECTIVE BOX
Patient is a 76y old  Female who presents with a chief complaint of kyphoplasty (05 Feb 2022 06:40)      INTERVAL HPI/OVERNIGHT EVENTS: Patient seen and examined at bedside. No overnight events. Had kyphoplasty yesterday which was aborted due to vomiting intra op. Feels well today. No new complaints.       MEDICATIONS  (STANDING):  amLODIPine   Tablet 5 milliGRAM(s) Oral daily  atorvastatin 10 milliGRAM(s) Oral at bedtime  ceFAZolin   IVPB 2000 milliGRAM(s) IV Intermittent every 8 hours  gabapentin 100 milliGRAM(s) Oral three times a day  influenza  Vaccine (HIGH DOSE) 0.7 milliLiter(s) IntraMuscular once  metoprolol succinate ER 25 milliGRAM(s) Oral daily  sodium chloride 0.9% 1000 milliLiter(s) (75 mL/Hr) IV Continuous <Continuous>    MEDICATIONS  (PRN):  acetaminophen     Tablet .. 650 milliGRAM(s) Oral every 6 hours PRN Temp greater or equal to 38C (100.4F), Mild Pain (1 - 3)  ALBUTerol    90 MICROgram(s) HFA Inhaler 2 Puff(s) Inhalation every 6 hours PRN Shortness of Breath and/or Wheezing  bisacodyl 5 milliGRAM(s) Oral every 12 hours PRN Constipation  HYDROmorphone  Injectable 0.5 milliGRAM(s) IV Push every 3 hours PRN for breakthrough pain  oxyCODONE    IR 5 milliGRAM(s) Oral every 3 hours PRN Moderate Pain (4 - 6)  oxyCODONE    IR 10 milliGRAM(s) Oral every 3 hours PRN Severe Pain (7 - 10)      Allergies    No Known Allergies    Intolerances        REVIEW OF SYSTEMS:  CONSTITUTIONAL: No fever or chills  HEENT:  No headache, no sore throat  RESPIRATORY: No cough, wheezing, or shortness of breath  CARDIOVASCULAR: No chest pain, palpitations, or leg swelling  GASTROINTESTINAL: No abd pain, nausea, vomiting, or diarrhea  GENITOURINARY: No dysuria, frequency, or hematuria  NEUROLOGICAL: no focal weakness or dizziness  MUSCULOSKELETAL: no myalgias     Vital Signs Last 24 Hrs  T(C): 36.6 (05 Feb 2022 05:51), Max: 37.3 (05 Feb 2022 00:01)  T(F): 97.8 (05 Feb 2022 05:51), Max: 99.1 (05 Feb 2022 00:01)  HR: 61 (05 Feb 2022 05:51) (54 - 76)  BP: 112/64 (05 Feb 2022 05:51) (100/55 - 128/58)  BP(mean): --  RR: 16 (05 Feb 2022 05:51) (11 - 21)  SpO2: 95% (05 Feb 2022 00:01) (94% - 99%)    PHYSICAL EXAM:  General: NAD, elderly   HEENT: Normocephalic, atraumatic, PERLLA, EOMI bilateral, moist mucous membranes   Neurology: Alert and oriented x2-3, nonfocal, sensation intact   Respiratory: Clear to auscultation bilateral, no wheezing, rales or ronchi, on room air   Cardio: S1,S2, no murmurs, rubs or gallops  Abdominal: Soft, non-tender, non-distended bowel sounds present x4, no palpable masses  Extremities: No clubbing, cyanosis or edema, peripheral pulses present  MSK: Normal range of motion, no joint erythema or warmth, no joint swelling   Heme: No obvious ecchymosis or petechiae   Skin: warm, dry, normal color    LABS:                        11.8   5.24  )-----------( 179      ( 05 Feb 2022 07:34 )             36.3     CBC Full  -  ( 05 Feb 2022 07:34 )  WBC Count : 5.24 K/uL  Hemoglobin : 11.8 g/dL  Hematocrit : 36.3 %  Platelet Count - Automated : 179 K/uL  Mean Cell Volume : 89.6 fl  Mean Cell Hemoglobin : 29.1 pg  Mean Cell Hemoglobin Concentration : 32.5 gm/dL  Auto Neutrophil # : x  Auto Lymphocyte # : x  Auto Monocyte # : x  Auto Eosinophil # : x  Auto Basophil # : x  Auto Neutrophil % : x  Auto Lymphocyte % : x  Auto Monocyte % : x  Auto Eosinophil % : x  Auto Basophil % : x      Ca    8.4        04 Feb 2022 06:22      PT/INR - ( 03 Feb 2022 08:34 )   PT: 12.9 sec;   INR: 1.07 ratio         PTT - ( 03 Feb 2022 08:34 )  PTT:28.4 sec    CAPILLARY BLOOD GLUCOSE              RADIOLOGY & ADDITIONAL TESTS: < from: Xray Chest 1 View AP/PA (02.04.22 @ 18:30) >    ACC: [id number] EXAM:  XR CHEST AP OR PA 1V                          PROCEDURE DATE:  02/04/2022          INTERPRETATION:  Portable chest radiograph    CLINICAL INFORMATION: Assess for aspiration. Vomiting.    TECHNIQUE:  Portable  AP chest radiograph.    COMPARISON: 2/3/2022 chest .    FINDINGS:  CATHETERS AND TUBES: None    PULMONARY: The visualized lungs are clear of airspace consolidations or   effusions.   No pneumothorax.    HEART/VASCULAR: The heart and mediastinum size and configuration are   within normal limits.    BONES: Visualized osseous structures are intact.    IMPRESSION:   No radiographic evidence of active chest disease.    --- End of Report ---            ANU HOUSE MD; Attending Radiologist  This document has been electronically signed. Feb 4 2022  9:15PM    < end of copied text >      Consultant(s) Notes Reviewed:  [x] YES  [ ] NO    Care Discussed with [x] Consultants  [x] Patient  [ ] Family  [ ]      [ x] Other; RN  DVT ppx

## 2022-02-05 NOTE — DISCHARGE NOTE PROVIDER - NSDCFUADDINST_GEN_ALL_CORE_FT
- Call your doctor if you experience:  • An increase in pain not controlled by pain medication or change in activity or  position.  • Temperature greater than 101° F.  • Redness, increased swelling or foul smelling drainage from or around the  incision.  • Numbness, tingling or a change in color or temperature of the operative extremity.  • Call your doctor immediately if you experience chest pain, shortness of breath or calf pain.    For Constipation :   • Increase your water intake. Drink at least 8 glasses of water daily.  • Try adding fiber to your diet by eating fruits, vegetables and foods that are rich in grains.  • If you do experience constipation, you may take an over-the-counter stool softener/laxative such as  Colace, Senokot or  Milk of Magnesia.

## 2022-02-05 NOTE — PHYSICAL THERAPY INITIAL EVALUATION ADULT - ADDITIONAL COMMENTS
Pt reports of being a poor historian. Pt lives alone in the 1st floor of apartment. Pt's sister lives on the 2nd floor. Pt has stairs to enter with rails but unable to quantify number of stairs.  Pt does not have stairs inside her home. Pt owns a cane, walker, and rollator. Pt has a tub with bars attached to the walls. Pt reports of being a poor historian. Pt lives alone in the 1st floor of apartment. Pt's sister lives on the 2nd floor. Pt has stairs to enter 3-4 with rails .  Pt does not have stairs inside her home. Pt owns a cane, walker, and rollator. Pt has a tub with bars attached to the walls.

## 2022-02-05 NOTE — PROGRESS NOTE ADULT - SUBJECTIVE AND OBJECTIVE BOX
Patient is a 76y Female with a known history of :    HPI:  75 yo F HTN, SVT, COPD, basal cell carcinoma presenting for elective kyphoplasty with orthopedics. Patient is a limited historian but relates she had a fall in April but unable to provide further information. Patient denies any other complaints. Denies headaches, dizziness, fevers, chest pain, palpitations, nausea, vomiting.      In the ED: vss, cbc wnl, potassium 3.0. (03 Feb 2022 11:06)      REVIEW OF SYSTEMS:    CONSTITUTIONAL: No fever, weight loss, or fatigue  EYES: No eye pain, visual disturbances, or discharge  ENMT:  No difficulty hearing, tinnitus, vertigo; No sinus or throat pain  NECK: No pain or stiffness  BREASTS: No pain, masses, or nipple discharge  RESPIRATORY: No cough, wheezing, chills or hemoptysis; No shortness of breath  CARDIOVASCULAR: No chest pain, palpitations, dizziness, or leg swelling  GASTROINTESTINAL: No abdominal or epigastric pain. No nausea, vomiting, or hematemesis; No diarrhea or constipation. No melena or hematochezia.  GENITOURINARY: No dysuria, frequency, hematuria, or incontinence  NEUROLOGICAL: No headaches, memory loss, loss of strength, numbness, or tremors  SKIN: No itching, burning, rashes, or lesions   LYMPH NODES: No enlarged glands  ENDOCRINE: No heat or cold intolerance; No hair loss  MUSCULOSKELETAL: No joint pain or swelling; No muscle, back, or extremity pain  PSYCHIATRIC: No depression, anxiety, mood swings, or difficulty sleeping  HEME/LYMPH: No easy bruising, or bleeding gums  ALLERGY AND IMMUNOLOGIC: No hives or eczema    MEDICATIONS  (STANDING):  amLODIPine   Tablet 5 milliGRAM(s) Oral daily  atorvastatin 10 milliGRAM(s) Oral at bedtime  gabapentin 100 milliGRAM(s) Oral three times a day  influenza  Vaccine (HIGH DOSE) 0.7 milliLiter(s) IntraMuscular once  metoprolol succinate ER 25 milliGRAM(s) Oral daily    MEDICATIONS  (PRN):  acetaminophen     Tablet .. 650 milliGRAM(s) Oral every 6 hours PRN Temp greater or equal to 38C (100.4F), Mild Pain (1 - 3)  ALBUTerol    90 MICROgram(s) HFA Inhaler 2 Puff(s) Inhalation every 6 hours PRN Shortness of Breath and/or Wheezing  bisacodyl 5 milliGRAM(s) Oral every 12 hours PRN Constipation  HYDROmorphone  Injectable 0.5 milliGRAM(s) IV Push every 3 hours PRN for breakthrough pain  oxyCODONE    IR 5 milliGRAM(s) Oral every 3 hours PRN Moderate Pain (4 - 6)  oxyCODONE    IR 10 milliGRAM(s) Oral every 3 hours PRN Severe Pain (7 - 10)      ALLERGIES: No Known Allergies      FAMILY HISTORY:      Social history:  Alochol:   Smoking:   Drug Use:   Marital Status:     PHYSICAL EXAMINATION:  -----------------------------  T(C): 36.5 (02-05-22 @ 08:08), Max: 37.3 (02-05-22 @ 00:01)  HR: 63 (02-05-22 @ 08:08) (54 - 76)  BP: 121/64 (02-05-22 @ 08:08) (100/55 - 128/58)  RR: 16 (02-05-22 @ 08:08) (11 - 21)  SpO2: 98% (02-05-22 @ 08:08) (94% - 99%)  Wt(kg): --    02-04 @ 07:01  -  02-05 @ 07:00  --------------------------------------------------------  IN:    Lactated Ringers: 500 mL  Total IN: 500 mL    OUT:    Blood Loss (mL): 10 mL    Voided (mL): 25 mL  Total OUT: 35 mL    Total NET: 465 mL      02-05 @ 07:01  -  02-05 @ 13:48  --------------------------------------------------------  IN:    IV PiggyBack: 50 mL  Total IN: 50 mL    OUT:  Total OUT: 0 mL    Total NET: 50 mL            Constitutional: well developed, normal appearance, well groomed, well nourished, no deformities and no acute distress.   Eyes: the conjunctiva exhibited no abnormalities and the eyelids demonstrated no xanthelasmas.   HEENT: normal oral mucosa, no oral pallor and no oral cyanosis.   Neck: normal jugular venous A waves present, normal jugular venous V waves present and no jugular venous mendoza A waves.   Pulmonary: no respiratory distress, normal respiratory rhythm and effort, no accessory muscle use and lungs were clear to auscultation bilaterally.   Cardiovascular: heart rate and rhythm were normal, normal S1 and S2 and no murmur, gallop, rub, heave or thrill are present.   Musculoskeletal: the gait could not be assessed.   Extremities: no clubbing of the fingernails, no localized cyanosis, no petechial hemorrhages and no ischemic changes.   Skin: normal skin color and pigmentation, no rash, no venous stasis, no skin lesions, no skin ulcer and no xanthoma was observed.   Psychiatric: oriented to person, place, and time, the affect was normal, the mood was normal and not feeling anxious.     LABS:   --------  02-05    141  |  106  |  19  ----------------------------<  123<H>  3.9   |  28  |  0.94    Ca    8.3<L>      05 Feb 2022 07:34    TPro  5.9<L>  /  Alb  3.0<L>  /  TBili  0.7  /  DBili  x   /  AST  13  /  ALT  16  /  AlkPhos  92  02-04                         11.8   5.24  )-----------( 179      ( 05 Feb 2022 07:34 )             36.3                   Radiology:

## 2022-02-05 NOTE — OCCUPATIONAL THERAPY INITIAL EVALUATION ADULT - ADDITIONAL COMMENTS
·Pt reports of being a poor historian. Pt lives alone in the 1st floor of apartment. Pt's sister lives on the 2nd floor. Pt has stairs to enter with rails but unable to quantify number of stairs.  Pt does not have stairs inside her home. Pt owns a cane, walker, and rollator. Pt has a tub with bars attached to the walls.

## 2022-02-05 NOTE — PHYSICAL THERAPY INITIAL EVALUATION ADULT - PRECAUTIONS/LIMITATIONS, REHAB EVAL
fall precautions/spinal precautions/surgical precautions
fall precautions/spinal precautions/surgical precautions

## 2022-02-05 NOTE — DISCHARGE NOTE PROVIDER - NSDCCPTREATMENT_GEN_ALL_CORE_FT
PRINCIPAL PROCEDURE  Procedure: Lumbar kyphoplasty  Findings and Treatment: No heavy lifting.  Avoid twisting and bending over at waist.  Change dressing if it becomes loose or soiled.  Observe low back precautions as described by the Physical Therapist.  Walking is your best exercise.  Call the doctor with questions, fevers, severe headache, new numbness/weakness or new pain not relieved by medication.

## 2022-02-05 NOTE — DISCHARGE NOTE PROVIDER - NSDCMRMEDTOKEN_GEN_ALL_CORE_FT
AMLODIPINE BESYLATE 5MG TAB: 1 tab(s) orally once a day  ATORVASTATIN 10 MG TABLET: TAKE 1 TABLET BY MOUTH EVERYDAY AT BEDTIME  Calcium 600+D:   GABAPENTIN 100MG CAP:   METOPROLOL SUCC ER 25 MG TAB: TAKE 1 TABLET BY MOUTH EVERY DAY   acetaminophen 325 mg oral tablet: 2 tab(s) orally every 6 hours, As needed, Temp greater or equal to 38C (100.4F), Mild Pain (1 - 3)  albuterol 90 mcg/inh inhalation aerosol: 2 puff(s) inhaled every 6 hours, As needed, Shortness of Breath and/or Wheezing  AMLODIPINE BESYLATE 5MG TAB: 1 tab(s) orally once a day  ATORVASTATIN 10 MG TABLET: TAKE 1 TABLET BY MOUTH EVERYDAY AT BEDTIME  bisacodyl 5 mg oral delayed release tablet: 1 tab(s) orally every 12 hours, As needed, Constipation  Calcium 600+D:   GABAPENTIN 100MG CAP:   METOPROLOL SUCC ER 25 MG TAB: TAKE 1 TABLET BY MOUTH EVERY DAY   acetaminophen 325 mg oral tablet: 2 tab(s) orally every 6 hours, As needed, Temp greater or equal to 38C (100.4F), Mild Pain (1 - 3)  albuterol 90 mcg/inh inhalation aerosol: 2 puff(s) inhaled every 6 hours, As needed, Shortness of Breath and/or Wheezing  amLODIPine 5 mg oral tablet: 1 tab(s) orally once a day  atorvastatin 10 mg oral tablet: 1 tab(s) orally once a day (at bedtime)  bisacodyl 5 mg oral delayed release tablet: 1 tab(s) orally every 12 hours, As needed, Constipation  Calcium 600+D:   gabapentin 100 mg oral capsule: 1 cap(s) orally 3 times a day  metoprolol succinate 25 mg oral tablet, extended release: 1 tab(s) orally once a day  oxyCODONE 5 mg oral tablet: 1 tab(s) orally every 4 hours while awake, As Needed -Moderate Pain (4 - 6) MDD:6

## 2022-02-05 NOTE — DISCHARGE NOTE NURSING/CASE MANAGEMENT/SOCIAL WORK - NSDCPEFALRISK_GEN_ALL_CORE
For information on Fall & Injury Prevention, visit: [email or web address] OR  [email or web address] OR  https://www.cdc.gov/steadi/patient.html

## 2022-02-05 NOTE — DISCHARGE NOTE PROVIDER - CARE PROVIDER_API CALL
Arian Bryan (MD)  Orthopaedic Surgery  [address], [address]  [place] [address]  Phone: [phone]  Fax: [phone]  Follow Up Time:    Arian Bryan (MD)  Orthopaedic Surgery  [address], [address]  [place] [address]  Phone: [phone]  Fax: [phone]  Follow Up Time: 2 weeks    Neno Cabrera ()  Internal Medicine  [address] [place] [address]  Phone: [phone]  Fax: [phone]  Follow Up Time: 1 week

## 2022-02-05 NOTE — PROGRESS NOTE ADULT - PROVIDER SPECIALTY LIST ADULT
Cardiology
Orthopedics
Orthopedics
Cardiology
Hospitalist
Pulmonology
Neurology
Neurology
Pulmonology
Hospitalist

## 2022-02-05 NOTE — PROGRESS NOTE ADULT - ASSESSMENT
76 year old female with a history of HTN, HL, COPD, SDH, SVT who presents with back pain, for kyphoplasty.  C/o Mild Low back pain now when not moving, radiates to back of her left thigh upto knee  H/o SDH  No Focal weakness.  CT Head was done last night, This exam was compared with prior brain MRI performed on November 30, 2021.   - No acute bleed.  Pt is cleared for orthopedic procedure/Kyphoplasty.  Pt does seem to have poor cognition.  Would follow.  
77 yo F HTN, SVT, COPD, basal cell carcinoma presenting for elective kyphoplasty with orthopedics.     #Kyphoplasty  Planned for tomorrow with orthopedics  Neuro, pulm and cardio input appreciated  CT head with no acute changes, showing encephalomalacia and gliosis  Patient medically optimized at intermediate risk for planned orthopedic procedure with routine hemodynamic monitoring    #COPD  Continue inhalers    #CAD  Continue statin  Patient notes she stopped taking statin at home because she "doesn't like to take medications"     #Hypokalemia  Potassium supplemented    #Hx of SVT  Continue Metoprolol    #HTN  Continue Amlodipine    #DVT ppx  Per ortho  
The patient is a 76 year old female with a history of HTN, HL, COPD, SDH, SVT who presents with back pain, for kyphoplasty.     2/5/22  Seen at [facility]  No cardiac complaints    Plan:  - ECG with no evidence of ischemia or infarction  - HTN - continue amlodipine 5 mg daily. Hold HCTZ for now.  - History of SVT - continue metoprolol succinate 25 mg daily  - Echo 11/20 with normal LV systolic function, no significant valve issues  - Continue atorvastatin 10 mg daily  - No evidence of decompensated heart failure on exam  - Patient is stable from a cardiac stand-point
The patient is a 76 year old female with a history of HTN, HL, COPD, SDH, SVT who presents with back pain, for kyphoplasty.     Plan:  - ECG with no evidence of ischemia or infarction  - HTN - continue amlodipine 5 mg daily. Hold HCTZ for now.  - History of SVT - continue metoprolol succinate 25 mg daily  - Echo 11/20 with normal LV systolic function, no significant valve issues  - Continue atorvastatin 10 mg daily  - No evidence of decompensated heart failure on exam  - There are no active cardiac issues. The patient is at intermediate risk for cardiac events for an intermediate risk surgery. She is optimized to proceed without additional cardiac testing.
pt with back pain - OP - OA - HTN - COPD - Ex Smoker -     POD 1  Kypho of LS spine  CXR - No Active Disease  VBG noted  VS noted    I alice  pain rx regimen  bowel rx regimen  neuro exam  tolerating RA - Vs noted  CXR noted -   dvt p   Proventil PRN for sob and or wheezing  will need PFT as outpatient  seasonal vaccination 
pt with back pain - OP - OA - HTN - COPD - Ex Smoker -     VBG noted  labs reviewed  NPO for OR  VS noted    now eval for Ortho Spine - Kyphoplasty  I alice  pain rx regimen  bowel rx regimen  neuro exam  tolerating RA - Vs noted  CXR noted -   dvt p   Proventil PRN for sob and or wheezing  will need PFT as outpatient  seasonal vaccination 
76 year old female with a history of HTN, HL, COPD, SDH, SVT who presents with back pain, for kyphoplasty.  C/o Mild Low back pain now when not moving, radiates to back of her left thigh upto knee  H/o SDH  No Focal weakness.  CT Head was done last night, This exam was compared with prior brain MRI performed on November 30, 2021.   - No acute bleed.  Kyphoplasty was not done  -Pt vomited in OR.  Fall precautions.  Would follow.  
75 yo F HTN, SVT, COPD, basal cell carcinoma presenting for elective kyphoplasty with orthopedics.     #Kyphoplasty  POD #1, procedure was aborted due to vomiting intra op   PT/OT  Early ambulation  incentive spirometry  Neuro, pulm and cardio input appreciated  F/u ortho reccs    #COPD  Continue inhalers    #CAD  Continue statin  Patient notes she stopped taking statin at home because she "doesn't like to take medications"     #Hypokalemia  stable  trend labs     #Hx of SVT  Continue Metoprolol    #HTN  Continue Amlodipine    #DVT ppx  Per ortho

## 2022-02-05 NOTE — DISCHARGE NOTE NURSING/CASE MANAGEMENT/SOCIAL WORK - PATIENT PORTAL LINK FT
You can access the FollowMyHealth Patient Portal offered by [facility] by registering at the following website: [email or web address]. By joining [facility]’s FollowMyHealth portal, you will also be able to view your health information using other applications (apps) compatible with our system.

## 2022-02-05 NOTE — PROGRESS NOTE ADULT - REASON FOR ADMISSION
kyphoplasty

## 2022-02-05 NOTE — PHYSICAL THERAPY INITIAL EVALUATION ADULT - BED MOBILITY TRAINING, PT EVAL
Goals 3-5 sessions: Pt will be able to independently supine <-> sit via log roll after surgery Goals 1-2 sessions: Pt will be able to independently supine <-> sit via log roll after surgery

## 2022-02-05 NOTE — DISCHARGE NOTE PROVIDER - HOSPITAL COURSE
FROM ADMISSION H+P:   HPI:  77 yo F HTN, SVT, COPD, basal cell carcinoma presenting for elective kyphoplasty with orthopedics. Patient is a limited historian but relates she had a fall in April but unable to provide further information. Patient denies any other complaints. Denies headaches, dizziness, fevers, chest pain, palpitations, nausea, vomiting.      In the ED: vss, cbc wnl, potassium 3.0.      ---  HOSPITAL COURSE:   #Kyphoplasty  POD #1, procedure was aborted due to vomiting intra op   PT/OT- cleared for dc home  Follow up with ortho  Spoke with Dr. Bryan, vomitus appeared to be gastric fluid in color. No obvious explanation for it but patient was asked to see GI on discharge. Patient feels well otherwise and CXR with no changes, has been afebrile with no leukocytosis. Will refer to GI on discharge     #COPD  Continue inhalers    #CAD  Continue statin    #Hypokalemia  stable  trend labs     #Hx of SVT  Continue Metoprolol    #HTN  Continue Amlodipine    ---  TIME SPENT:  I, the attending physician, was physically present for the key portions of the evaluation and management (E/M) service provided. The total amount of time spent reviewing the hospital notes, laboratory values, imaging findings, assessing/counseling the patient, discussing with consultant physicians, social work, nursing staff was 35 minutes

## 2022-02-05 NOTE — PHYSICAL THERAPY INITIAL EVALUATION ADULT - GAIT TRAINING, PT EVAL
Goal 3-5 sessions: pt will be able to independently ambulate 150 feet with rolling walker after surgery Goal 1-2 sessions: pt will be able to independently ambulate 150 feet with rolling walker/cane after surgery

## 2022-02-05 NOTE — PHYSICAL THERAPY INITIAL EVALUATION ADULT - PERTINENT HX OF CURRENT PROBLEM, REHAB EVAL
75 yo F HTN, SVT, COPD, basal cell carcinoma presenting for elective kyphoplasty with orthopedics.
75 yo F HTN, SVT, COPD, basal cell carcinoma presenting for elective kyphoplasty with orthopedics.

## 2022-02-05 NOTE — PROGRESS NOTE ADULT - SUBJECTIVE AND OBJECTIVE BOX
POST OPERATIVE DAY #:  [ 1]   STATUS POST: Kyphoplasty L1,L4  aborted L# due to vomit in OR                     Patient is a 76y old  Female who presents with a chief complaint of kyphoplasty (05 Feb 2022 06:40)      Pain well controlled  pt claims hip pain is better than prior to Kypho    OBJECTIVE:     Vital Signs Last 24 Hrs  T(C): 36.6 (05 Feb 2022 05:51), Max: 37.3 (05 Feb 2022 00:01)  T(F): 97.8 (05 Feb 2022 05:51), Max: 99.1 (05 Feb 2022 00:01)  HR: 61 (05 Feb 2022 05:51) (54 - 76)  BP: 112/64 (05 Feb 2022 05:51) (100/55 - 128/58)  BP(mean): --  RR: 16 (05 Feb 2022 05:51) (11 - 21)  SpO2: 95% (05 Feb 2022 00:01) (94% - 99%)    Back         DressingX 4  clean/dry/intact           Sensation; intact to light touch throughout         Motor exam: Toes warm and mobile        No calf tenderness bilateral LE's    LABS:                        11.8   5.24  )-----------( 179      ( 05 Feb 2022 07:34 )             36.3     02-04    144  |  107  |  12  ----------------------------<  96  3.4<L>   |  29  |  0.76    Ca    8.4      04 Feb 2022 06:22    TPro  5.9<L>  /  Alb  3.0<L>  /  TBili  0.7  /  DBili  x   /  AST  13  /  ALT  16  /  AlkPhos  92  02-04            A/P :    -    Analgesics PRN  -    DVT ppx: PAS, oob  -    Check AM labs  -    Weight bearing status: WBAT [ x]        PWB    [ ]     TTWB  [ ]      NWB  [ ]  -    Physical Therapy  -    Occupational Therapy  -    Dispo: Home [x ]     Rehab [ ]      TYRELL [ ]      To be determined [ ]

## 2022-02-05 NOTE — PHYSICAL THERAPY INITIAL EVALUATION ADULT - TRANSFER TRAINING, PT EVAL
Goal 3-5 sessions: pt will be able to independently transfer with rolling walker after surgery Goal 1-2 sessions: pt will be able to independently transfer with cane/rolling walker after surgery

## 2022-02-05 NOTE — DISCHARGE NOTE PROVIDER - PROVIDER TOKENS
PROVIDER:[TOKEN:[[id number]]] PROVIDER:[TOKEN:[[id number]],FOLLOWUP:[2 weeks]],PROVIDER:[TOKEN:[[id number]],FOLLOWUP:[1 week]]

## 2022-02-18 ENCOUNTER — APPOINTMENT (OUTPATIENT)
Dept: INTERNAL MEDICINE | Facility: CLINIC | Age: 77
End: 2022-02-18
Payer: MEDICARE

## 2022-02-18 VITALS
OXYGEN SATURATION: 95 % | WEIGHT: 170 LBS | DIASTOLIC BLOOD PRESSURE: 76 MMHG | SYSTOLIC BLOOD PRESSURE: 131 MMHG | HEIGHT: 68 IN | BODY MASS INDEX: 25.76 KG/M2 | TEMPERATURE: 97.5 F | HEART RATE: 82 BPM | RESPIRATION RATE: 12 BRPM

## 2022-02-18 DIAGNOSIS — M54.16 RADICULOPATHY, LUMBAR REGION: ICD-10-CM

## 2022-02-18 PROCEDURE — 99214 OFFICE O/P EST MOD 30 MIN: CPT

## 2022-02-18 NOTE — HISTORY OF PRESENT ILLNESS
[de-identified] : 76 year old female accompanied by her sister with history of HLD, HTN, COPD,  Low back pain, SDH 9/2020 presents for follow-up \par \par Patient reports she had spinal surgery on 2/3/2022 for treatment of multiple lumbar compression fractures with Dr. Bryan . She was told at discharge to follow-up with her primary care doctor. She reports pain is improving but is not 100%. Currently ambulates with a cane. \par Denies any CP, SOB, N/V or abdominal pain \par \par She has a follow-up appt with the surgeon on 2/28/2022

## 2022-02-18 NOTE — PLAN
[FreeTextEntry1] : Follow\par \par Lumbar Radiculopathy/compression fractures \par S/P Lumbar arthroplasty \par next appt  2/3/2022\par \par HTN/HLD\par cont Amlodipine 5 mg daily\par cont Atorvastatin 10 mg daily\par cont Metoprolol 25 mg daily\par \par

## 2022-02-18 NOTE — PHYSICAL EXAM
[No Acute Distress] : no acute distress [Well Nourished] : well nourished [Normal Sclera/Conjunctiva] : normal sclera/conjunctiva [EOMI] : extraocular movements intact [Normal Outer Ear/Nose] : the outer ears and nose were normal in appearance [Normal Oropharynx] : the oropharynx was normal [No JVD] : no jugular venous distention [No Lymphadenopathy] : no lymphadenopathy [Supple] : supple [Thyroid Normal, No Nodules] : the thyroid was normal and there were no nodules present [No Respiratory Distress] : no respiratory distress  [No Accessory Muscle Use] : no accessory muscle use [Clear to Auscultation] : lungs were clear to auscultation bilaterally [Normal Rate] : normal rate  [Regular Rhythm] : with a regular rhythm [Normal S1, S2] : normal S1 and S2 [No Murmur] : no murmur heard [Pedal Pulses Present] : the pedal pulses are present [No Edema] : there was no peripheral edema [No Extremity Clubbing/Cyanosis] : no extremity clubbing/cyanosis [Soft] : abdomen soft [Non Tender] : non-tender [Non-distended] : non-distended [Normal Bowel Sounds] : normal bowel sounds [Normal Posterior Cervical Nodes] : no posterior cervical lymphadenopathy [Normal Anterior Cervical Nodes] : no anterior cervical lymphadenopathy [No CVA Tenderness] : no CVA  tenderness [No Joint Swelling] : no joint swelling [Grossly Normal Strength/Tone] : grossly normal strength/tone [No Rash] : no rash [No Focal Deficits] : no focal deficits [Normal Affect] : the affect was normal [Normal Insight/Judgement] : insight and judgment were intact [de-identified] : + steri strips in place x4 / no evidence of infection  [de-identified] : ambulates with cane

## 2022-02-21 LAB
ALBUMIN SERPL ELPH-MCNC: 4.2 G/DL
ALP BLD-CCNC: 94 U/L
ALT SERPL-CCNC: 9 U/L
ANION GAP SERPL CALC-SCNC: 12 MMOL/L
AST SERPL-CCNC: 17 U/L
BILIRUB SERPL-MCNC: 0.5 MG/DL
BUN SERPL-MCNC: 9 MG/DL
CALCIUM SERPL-MCNC: 9.6 MG/DL
CHLORIDE SERPL-SCNC: 104 MMOL/L
CO2 SERPL-SCNC: 28 MMOL/L
CREAT SERPL-MCNC: 0.86 MG/DL
GLUCOSE SERPL-MCNC: 96 MG/DL
POTASSIUM SERPL-SCNC: 3.7 MMOL/L
PROT SERPL-MCNC: 6.4 G/DL
SODIUM SERPL-SCNC: 143 MMOL/L

## 2022-02-28 ENCOUNTER — APPOINTMENT (OUTPATIENT)
Dept: ORTHOPEDIC SURGERY | Facility: CLINIC | Age: 77
End: 2022-02-28
Payer: MEDICARE

## 2022-02-28 VITALS
WEIGHT: 172 LBS | BODY MASS INDEX: 26.07 KG/M2 | DIASTOLIC BLOOD PRESSURE: 76 MMHG | HEIGHT: 68 IN | SYSTOLIC BLOOD PRESSURE: 127 MMHG | HEART RATE: 80 BPM

## 2022-02-28 DIAGNOSIS — M54.16 RADICULOPATHY, LUMBAR REGION: ICD-10-CM

## 2022-02-28 DIAGNOSIS — M84.48XS PATHOLOGICAL FRACTURE, OTHER SITE, SEQUELA: ICD-10-CM

## 2022-02-28 PROCEDURE — 99024 POSTOP FOLLOW-UP VISIT: CPT

## 2022-02-28 PROCEDURE — 72100 X-RAY EXAM L-S SPINE 2/3 VWS: CPT

## 2022-02-28 NOTE — HISTORY OF PRESENT ILLNESS
[___ Weeks Post Op] : [unfilled] weeks post op [Clean/Dry/Intact] : clean, dry and intact [Vascular Intact] : ~T peripheral vascular exam normal [Negative Trent's] : maneuvers demonstrated a negative Trent's sign [Slow Progress] : is progressing slowly [No Sign of Infection] : is showing no signs of infection [Adequate Pain Control] : has adequate pain control [Chills] : no chills [Constipation] : no constipation [Diarrhea] : no diarrhea [Dysuria] : no dysuria [Fever] : no fever [Nausea] : no nausea [Vomiting] : no vomiting [Discharge] : absent of discharge [Swelling] : not swollen [Dehiscence] : not dehisced [de-identified] : Kyphoplasty L1, L4 2/4/22 [de-identified] : Patient is here today for her first post operative office visit. Overall just feeling okay. Patient wearing  back brace and states still having left leg pain. Pain level is a 12+ not taking pain medication using cane to ambulate.\par  Patient also states had bone density recently. [de-identified] : stooped forward\par cane\par Healed incisions.   [de-identified] : AP and lateral views of the lumbar spine demonstrates partially visualized right hip arthroplasty implant.  Significant left hip degeneration noted.  On the lateral projection cement seen in the vertebral body L4 and L1.  Superior plate compression deformity seen of the L3 vertebral body and L2 is well preserved without obvious compression.  Calcification abdominal aorta.  The patient has reported some back pain and understands that we had to stop the procedure early because of her [de-identified] : Gastric reflux.  She had an L1 and L4 compression fracture with the possibility that she would need a T12 and L3 fracture cement augmentation in the future. [de-identified] : start PT\par wean off TLSO\par will consider additional kyphoplasty or lumbar KATELIN for left sided leg pain.. Gastric reflux.  She had an L1 and L4 compression fracture with the possibility that she would need a T12 and L3 fracture cement augmentation in the future.  Left L4 and possibly L3 L5 epidural steroid injections to be considered in the future.

## 2022-04-11 ENCOUNTER — APPOINTMENT (OUTPATIENT)
Dept: ORTHOPEDIC SURGERY | Facility: CLINIC | Age: 77
End: 2022-04-11
Payer: MEDICARE

## 2022-04-11 VITALS
BODY MASS INDEX: 25.85 KG/M2 | WEIGHT: 170 LBS | SYSTOLIC BLOOD PRESSURE: 119 MMHG | DIASTOLIC BLOOD PRESSURE: 78 MMHG | HEART RATE: 71 BPM

## 2022-04-11 DIAGNOSIS — Z98.890 OTHER SPECIFIED POSTPROCEDURAL STATES: ICD-10-CM

## 2022-04-11 DIAGNOSIS — M51.36 OTHER INTERVERTEBRAL DISC DEGENERATION, LUMBAR REGION: ICD-10-CM

## 2022-04-11 PROCEDURE — 99024 POSTOP FOLLOW-UP VISIT: CPT

## 2022-04-11 PROCEDURE — 72100 X-RAY EXAM L-S SPINE 2/3 VWS: CPT

## 2022-04-11 PROCEDURE — 72170 X-RAY EXAM OF PELVIS: CPT | Mod: 59

## 2022-04-11 NOTE — HISTORY OF PRESENT ILLNESS
[___ Months Post Op] : [unfilled] months post op [10] : the patient reports pain that is 10/10 in severity [Healed] : healed [Slow Progress] : is progressing slowly [No Sign of Infection] : is showing no signs of infection [Adequate Pain Control] : has adequate pain control [de-identified] : Kyphoplasty L1, L4; date of surgery 2/4/22\par Left groin pain and painful weightbearing. [de-identified] : Patient presents with pain to low back that radiates into left groin over the past few 1 week.  Patient states the pain is constant but worsens when going from sitting to standing and from standing to sitting positions.  Patient denies complaints of numbness or tingling.  Increased pain with sudden movements.  Patient is wearing brace at all times.\par Patient complains of nausea, depression symptoms over the past few weeks.\par Patient was attending physical therapy 2x/week but stopped last week due to the pain. [de-identified] : seen with her niece\par antalgic gait left\par cane\par Significant left groin pain with hip internal and external rotation with range of motion limited to 5 degrees internal 5 degrees external rotation.  On the right side for comparison 30 degrees external rotation and 20 degrees internal rotation noted pain-free.  5 out of 5 motor strength hip flexion knee extension flexion ankle dorsi plantarflexion EHL bilaterally.  Grossly intact light touch sensation bilateral lower extremities. [de-identified] : 2 views lumbar spine obtained today demonstrate partially visualized right total hip arthroplasty implant.  Cement is seen in the vertebral bodies at L4 and L1.  Calcification abdominal aorta.  Loss of disc height at L5-S1 with degenerative changes.\par \par AP pelvis demonstrates significant left hip osteoarthrosis with loss of joint space bone-on-bone apposition and inferior osteophyte.  No acute fractures. [de-identified] : At this point the patient presents with symptoms consistent with left hip osteoarthritis.  She will follow up with Dr. Michaud in this office for further evaluation management of that condition.  From a spine standpoint she is doing well with no back pain complaints.  I will see her back on as-needed basis for her lumbar spine.

## 2022-04-23 ENCOUNTER — APPOINTMENT (OUTPATIENT)
Dept: ORTHOPEDIC SURGERY | Facility: CLINIC | Age: 77
End: 2022-04-23
Payer: MEDICARE

## 2022-04-23 VITALS
DIASTOLIC BLOOD PRESSURE: 74 MMHG | WEIGHT: 172 LBS | SYSTOLIC BLOOD PRESSURE: 120 MMHG | HEART RATE: 69 BPM | BODY MASS INDEX: 26.07 KG/M2 | HEIGHT: 68 IN

## 2022-04-23 DIAGNOSIS — M16.12 UNILATERAL PRIMARY OSTEOARTHRITIS, LEFT HIP: ICD-10-CM

## 2022-04-23 PROCEDURE — 99214 OFFICE O/P EST MOD 30 MIN: CPT

## 2022-04-23 RX ORDER — GABAPENTIN 100 MG/1
100 CAPSULE ORAL
Qty: 30 | Refills: 1 | Status: DISCONTINUED | COMMUNITY
Start: 2021-12-02 | End: 2022-04-23

## 2022-04-23 RX ORDER — OXYCODONE 5 MG/1
5 TABLET ORAL
Qty: 30 | Refills: 0 | Status: DISCONTINUED | COMMUNITY
Start: 2022-02-05 | End: 2022-04-23

## 2022-05-03 ENCOUNTER — OUTPATIENT (OUTPATIENT)
Dept: OUTPATIENT SERVICES | Facility: HOSPITAL | Age: 77
LOS: 1 days | End: 2022-05-03
Payer: MEDICARE

## 2022-05-03 VITALS
TEMPERATURE: 98 F | OXYGEN SATURATION: 97 % | HEART RATE: 69 BPM | SYSTOLIC BLOOD PRESSURE: 132 MMHG | RESPIRATION RATE: 15 BRPM | HEIGHT: 67 IN | WEIGHT: 169.76 LBS | DIASTOLIC BLOOD PRESSURE: 77 MMHG

## 2022-05-03 DIAGNOSIS — M25.552 PAIN IN LEFT HIP: ICD-10-CM

## 2022-05-03 DIAGNOSIS — Z01.818 ENCOUNTER FOR OTHER PREPROCEDURAL EXAMINATION: ICD-10-CM

## 2022-05-03 DIAGNOSIS — M16.12 UNILATERAL PRIMARY OSTEOARTHRITIS, LEFT HIP: ICD-10-CM

## 2022-05-03 DIAGNOSIS — Z98.890 OTHER SPECIFIED POSTPROCEDURAL STATES: Chronic | ICD-10-CM

## 2022-05-03 DIAGNOSIS — Z96.641 PRESENCE OF RIGHT ARTIFICIAL HIP JOINT: Chronic | ICD-10-CM

## 2022-05-03 LAB
A1C WITH ESTIMATED AVERAGE GLUCOSE RESULT: 5.4 % — SIGNIFICANT CHANGE UP (ref 4–5.6)
ALBUMIN SERPL ELPH-MCNC: 3.6 G/DL — SIGNIFICANT CHANGE UP (ref 3.3–5)
ALP SERPL-CCNC: 73 U/L — SIGNIFICANT CHANGE UP (ref 30–120)
ALT FLD-CCNC: 14 U/L DA — SIGNIFICANT CHANGE UP (ref 10–60)
ANION GAP SERPL CALC-SCNC: 3 MMOL/L — LOW (ref 5–17)
APTT BLD: 28.9 SEC — SIGNIFICANT CHANGE UP (ref 27.5–35.5)
AST SERPL-CCNC: 21 U/L — SIGNIFICANT CHANGE UP (ref 10–40)
BILIRUB SERPL-MCNC: 0.4 MG/DL — SIGNIFICANT CHANGE UP (ref 0.2–1.2)
BUN SERPL-MCNC: 12 MG/DL — SIGNIFICANT CHANGE UP (ref 7–23)
CALCIUM SERPL-MCNC: 9.4 MG/DL — SIGNIFICANT CHANGE UP (ref 8.4–10.5)
CHLORIDE SERPL-SCNC: 104 MMOL/L — SIGNIFICANT CHANGE UP (ref 96–108)
CO2 SERPL-SCNC: 31 MMOL/L — SIGNIFICANT CHANGE UP (ref 22–31)
CREAT SERPL-MCNC: 1.16 MG/DL — SIGNIFICANT CHANGE UP (ref 0.5–1.3)
EGFR: 49 ML/MIN/1.73M2 — LOW
ESTIMATED AVERAGE GLUCOSE: 108 MG/DL — SIGNIFICANT CHANGE UP (ref 68–114)
GLUCOSE SERPL-MCNC: 107 MG/DL — HIGH (ref 70–99)
HCT VFR BLD CALC: 42.5 % — SIGNIFICANT CHANGE UP (ref 34.5–45)
HGB BLD-MCNC: 13.7 G/DL — SIGNIFICANT CHANGE UP (ref 11.5–15.5)
INR BLD: 1.04 RATIO — SIGNIFICANT CHANGE UP (ref 0.88–1.16)
MCHC RBC-ENTMCNC: 29 PG — SIGNIFICANT CHANGE UP (ref 27–34)
MCHC RBC-ENTMCNC: 32.2 GM/DL — SIGNIFICANT CHANGE UP (ref 32–36)
MCV RBC AUTO: 90 FL — SIGNIFICANT CHANGE UP (ref 80–100)
MRSA PCR RESULT.: SIGNIFICANT CHANGE UP
NRBC # BLD: 0 /100 WBCS — SIGNIFICANT CHANGE UP (ref 0–0)
PLATELET # BLD AUTO: 196 K/UL — SIGNIFICANT CHANGE UP (ref 150–400)
POTASSIUM SERPL-MCNC: 3.9 MMOL/L — SIGNIFICANT CHANGE UP (ref 3.5–5.3)
POTASSIUM SERPL-SCNC: 3.9 MMOL/L — SIGNIFICANT CHANGE UP (ref 3.5–5.3)
PROT SERPL-MCNC: 7.1 G/DL — SIGNIFICANT CHANGE UP (ref 6–8.3)
PROTHROM AB SERPL-ACNC: 12 SEC — SIGNIFICANT CHANGE UP (ref 10.5–13.4)
RBC # BLD: 4.72 M/UL — SIGNIFICANT CHANGE UP (ref 3.8–5.2)
RBC # FLD: 13.4 % — SIGNIFICANT CHANGE UP (ref 10.3–14.5)
S AUREUS DNA NOSE QL NAA+PROBE: SIGNIFICANT CHANGE UP
SODIUM SERPL-SCNC: 138 MMOL/L — SIGNIFICANT CHANGE UP (ref 135–145)
WBC # BLD: 5.87 K/UL — SIGNIFICANT CHANGE UP (ref 3.8–10.5)
WBC # FLD AUTO: 5.87 K/UL — SIGNIFICANT CHANGE UP (ref 3.8–10.5)

## 2022-05-03 PROCEDURE — 86900 BLOOD TYPING SEROLOGIC ABO: CPT

## 2022-05-03 PROCEDURE — 83036 HEMOGLOBIN GLYCOSYLATED A1C: CPT

## 2022-05-03 PROCEDURE — 85610 PROTHROMBIN TIME: CPT

## 2022-05-03 PROCEDURE — 86901 BLOOD TYPING SEROLOGIC RH(D): CPT

## 2022-05-03 PROCEDURE — 36415 COLL VENOUS BLD VENIPUNCTURE: CPT

## 2022-05-03 PROCEDURE — 87641 MR-STAPH DNA AMP PROBE: CPT

## 2022-05-03 PROCEDURE — G0463: CPT

## 2022-05-03 PROCEDURE — 87640 STAPH A DNA AMP PROBE: CPT

## 2022-05-03 PROCEDURE — 80053 COMPREHEN METABOLIC PANEL: CPT

## 2022-05-03 PROCEDURE — 85730 THROMBOPLASTIN TIME PARTIAL: CPT

## 2022-05-03 PROCEDURE — 86850 RBC ANTIBODY SCREEN: CPT

## 2022-05-03 PROCEDURE — 85027 COMPLETE CBC AUTOMATED: CPT

## 2022-05-03 NOTE — H&P PST ADULT - PROBLEM SELECTOR PLAN 1
left total hip replacement, covid appt 5/20-11 am, preop instructions given, to go for medical, cardio and neuro clearances,

## 2022-05-03 NOTE — H&P PST ADULT - NSICDXPASTMEDICALHX_GEN_ALL_CORE_FT
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

## 2022-05-03 NOTE — H&P PST ADULT - HISTORY OF PRESENT ILLNESS
this is a 75 y/o female who has had left hip and back pain for yrs, she had to have kyphoplasty  low back after a fall 2/22; while she was in rehab she began having increased back pain which turned out was related to the hip problem; tests showed bone on bone; to have left hip replacement

## 2022-05-03 NOTE — H&P PST ADULT - NSICDXPASTSURGICALHX_GEN_ALL_CORE_FT
PAST SURGICAL HISTORY:  Benign breast lumps bilateral - 15 yrs ago    Hx of appendectomy @ 14 yrs old    Hx of cataract surgery bilateral - 2007 IOL    S/P kyphoplasty lumbar 2/22-fractures after fall    S/P total right hip arthroplasty 2013    Status post eye surgery laser left eye - 05/2013

## 2022-05-03 NOTE — H&P PST ADULT - NSICDXFAMILYHX_GEN_ALL_CORE_FT
FAMILY HISTORY:  Father  Still living? No  FH: lung cancer, Age at diagnosis: Age Unknown    Mother  Still living? No  FH: ovarian cancer, Age at diagnosis: Age Unknown

## 2022-05-05 ENCOUNTER — APPOINTMENT (OUTPATIENT)
Dept: INTERNAL MEDICINE | Facility: CLINIC | Age: 77
End: 2022-05-05
Payer: MEDICARE

## 2022-05-05 ENCOUNTER — APPOINTMENT (OUTPATIENT)
Dept: CARDIOLOGY | Facility: CLINIC | Age: 77
End: 2022-05-05
Payer: MEDICARE

## 2022-05-05 ENCOUNTER — NON-APPOINTMENT (OUTPATIENT)
Age: 77
End: 2022-05-05

## 2022-05-05 VITALS
WEIGHT: 172 LBS | SYSTOLIC BLOOD PRESSURE: 115 MMHG | TEMPERATURE: 98 F | HEIGHT: 68 IN | DIASTOLIC BLOOD PRESSURE: 66 MMHG | OXYGEN SATURATION: 96 % | RESPIRATION RATE: 14 BRPM | HEART RATE: 78 BPM | BODY MASS INDEX: 26.07 KG/M2

## 2022-05-05 DIAGNOSIS — M25.559 PAIN IN UNSPECIFIED HIP: ICD-10-CM

## 2022-05-05 DIAGNOSIS — I10 ESSENTIAL (PRIMARY) HYPERTENSION: ICD-10-CM

## 2022-05-05 DIAGNOSIS — Z01.818 ENCOUNTER FOR OTHER PREPROCEDURAL EXAMINATION: ICD-10-CM

## 2022-05-05 PROCEDURE — 99213 OFFICE O/P EST LOW 20 MIN: CPT

## 2022-05-05 PROCEDURE — 99214 OFFICE O/P EST MOD 30 MIN: CPT

## 2022-05-05 PROCEDURE — 93000 ELECTROCARDIOGRAM COMPLETE: CPT

## 2022-05-05 RX ORDER — ASPIRIN 81 MG/1
81 TABLET, CHEWABLE ORAL
Qty: 30 | Refills: 2 | Status: DISCONTINUED | COMMUNITY
Start: 2021-07-27 | End: 2022-05-05

## 2022-05-05 NOTE — HISTORY OF PRESENT ILLNESS
[FreeTextEntry1] : Left hip replacement [FreeTextEntry2] : May 23 2022 [FreeTextEntry3] : Dr. Vinson  [FreeTextEntry4] : 76 years old female former tobacco use with history hypertension/syncope with subdural hemorrhage 2020 /multiple lumbar compression fracture status post spinal surgery February 2022/ /SVT / presents for medical  clearance prior to left hip replacement.\par Patient denies chest pain, shortness of breath, dizziness, palpitation.,  She is forgetful at times.

## 2022-05-05 NOTE — PHYSICAL EXAM
[General Appearance - Well Developed] : well developed [Normal Appearance] : normal appearance [Well Groomed] : well groomed [General Appearance - Well Nourished] : well nourished [No Deformities] : no deformities [General Appearance - In No Acute Distress] : no acute distress [Normal Conjunctiva] : the conjunctiva exhibited no abnormalities [Eyelids - No Xanthelasma] : the eyelids demonstrated no xanthelasmas [Normal Oral Mucosa] : normal oral mucosa [No Oral Pallor] : no oral pallor [No Oral Cyanosis] : no oral cyanosis [Normal Jugular Venous A Waves Present] : normal jugular venous A waves present [Normal Jugular Venous V Waves Present] : normal jugular venous V waves present [No Jugular Venous Murray A Waves] : no jugular venous murray A waves [Exaggerated Use Of Accessory Muscles For Inspiration] : no accessory muscle use [Respiration, Rhythm And Depth] : normal respiratory rhythm and effort [Auscultation Breath Sounds / Voice Sounds] : lungs were clear to auscultation bilaterally [Heart Rate And Rhythm] : heart rate and rhythm were normal [Heart Sounds] : normal S1 and S2 [Murmurs] : no murmurs present [Abdomen Soft] : soft [Abdomen Tenderness] : non-tender [Abdomen Mass (___ Cm)] : no abdominal mass palpated [Nail Clubbing] : no clubbing of the fingernails [Cyanosis, Localized] : no localized cyanosis [Petechial Hemorrhages (___cm)] : no petechial hemorrhages [Skin Color & Pigmentation] : normal skin color and pigmentation [] : no rash [No Venous Stasis] : no venous stasis [Skin Lesions] : no skin lesions [No Skin Ulcers] : no skin ulcer [No Xanthoma] : no  xanthoma was observed [Oriented To Time, Place, And Person] : oriented to person, place, and time [Affect] : the affect was normal [Mood] : the mood was normal [No Anxiety] : not feeling anxious Attending Attestation (For Attendings USE Only)... [FreeTextEntry1] : wheelchair secondary to right hip pain

## 2022-05-05 NOTE — DISCUSSION/SUMMARY
[Procedure Intermediate Risk] : the procedure risk is intermediate [Patient Intermediate Risk] : the patient is an intermediate risk [Optimized for Surgery] : the patient is optimized for surgery [FreeTextEntry1] : The patient is a 76-year-old female ex smoker, hypertension,syncope with subdural hemorrhage, SVT controlled on toprol.\par #1 CV-  ECHO normal, c/w toprol 25mg daily for SVT \par #2 Carotid- doppler neg\par #3 Htn- c/w amlodipine\par #4 Subdural hemorrhage- stable\par #5 Neuro- back brace\par #6 Ortho- There are no cardiac contraindications to left hip replacement surgery.

## 2022-05-05 NOTE — HISTORY OF PRESENT ILLNESS
[Preoperative Visit] : for a medical evaluation prior to surgery [Date of Surgery ___] : on [unfilled] [Surgeon Name ___] : surgeon: [unfilled] [Scheduled Procedure ___] : a [unfilled] [FreeTextEntry1] : Vero is awaiting hip replacement. She went for PST and told to stop her vitamins. No CP, palpitations, dizziness or SOB.

## 2022-05-05 NOTE — PHYSICAL EXAM
[No Acute Distress] : no acute distress [Well Nourished] : well nourished [Normal Sclera/Conjunctiva] : normal sclera/conjunctiva [EOMI] : extraocular movements intact [Normal Outer Ear/Nose] : the outer ears and nose were normal in appearance [Normal Oropharynx] : the oropharynx was normal [No JVD] : no jugular venous distention [No Lymphadenopathy] : no lymphadenopathy [Supple] : supple [Thyroid Normal, No Nodules] : the thyroid was normal and there were no nodules present [No Respiratory Distress] : no respiratory distress  [No Accessory Muscle Use] : no accessory muscle use [Clear to Auscultation] : lungs were clear to auscultation bilaterally [Normal Rate] : normal rate  [Regular Rhythm] : with a regular rhythm [Normal S1, S2] : normal S1 and S2 [No Murmur] : no murmur heard [Pedal Pulses Present] : the pedal pulses are present [No Edema] : there was no peripheral edema [No Extremity Clubbing/Cyanosis] : no extremity clubbing/cyanosis [Soft] : abdomen soft [Non Tender] : non-tender [Non-distended] : non-distended [Normal Bowel Sounds] : normal bowel sounds [Normal Posterior Cervical Nodes] : no posterior cervical lymphadenopathy [Normal Anterior Cervical Nodes] : no anterior cervical lymphadenopathy [No CVA Tenderness] : no CVA  tenderness [No Joint Swelling] : no joint swelling [Grossly Normal Strength/Tone] : grossly normal strength/tone [No Rash] : no rash [No Focal Deficits] : no focal deficits [Normal Affect] : the affect was normal [Normal Insight/Judgement] : insight and judgment were intact [de-identified] : Back brace in place [de-identified] : Left hip tenderness with range of motion [de-identified] : ambulates with cane

## 2022-05-05 NOTE — PLAN
[FreeTextEntry1] : 76 years old female medically stable for planned surgery.  Patient was seen and evaluated by cardiology prior to the surgery .\par Hypertension/ SVT : Continue amlodipine/Toprol 25 mg daily\par History of subdural hemorrhage> stable\par Hyperlipidemia: On atorvastatin 10 mg daily\par \par Labs reviewed.

## 2022-05-10 ENCOUNTER — APPOINTMENT (OUTPATIENT)
Dept: NEUROLOGY | Facility: CLINIC | Age: 77
End: 2022-05-10
Payer: MEDICARE

## 2022-05-10 VITALS
HEART RATE: 75 BPM | DIASTOLIC BLOOD PRESSURE: 68 MMHG | WEIGHT: 170 LBS | SYSTOLIC BLOOD PRESSURE: 130 MMHG | BODY MASS INDEX: 25.76 KG/M2 | HEIGHT: 68 IN

## 2022-05-10 DIAGNOSIS — S06.5X9A TRAUMATIC SUBDURAL HEMORRHAGE WITH LOSS OF CONSCIOUSNESS OF UNSPECIFIED DURATION, INITIAL ENCOUNTER: ICD-10-CM

## 2022-05-10 DIAGNOSIS — I63.9 CEREBRAL INFARCTION, UNSPECIFIED: ICD-10-CM

## 2022-05-10 DIAGNOSIS — I63.412 CEREBRAL INFARCTION DUE TO EMBOLISM OF LEFT MIDDLE CEREBRAL ARTERY: ICD-10-CM

## 2022-05-10 PROCEDURE — 99213 OFFICE O/P EST LOW 20 MIN: CPT

## 2022-05-17 PROBLEM — I63.9 CEREBRAL INFARCTION, UNSPECIFIED: Chronic | Status: ACTIVE | Noted: 2022-05-03

## 2022-05-20 ENCOUNTER — OUTPATIENT (OUTPATIENT)
Dept: OUTPATIENT SERVICES | Facility: HOSPITAL | Age: 77
LOS: 1 days | End: 2022-05-20
Payer: MEDICARE

## 2022-05-20 DIAGNOSIS — Z98.890 OTHER SPECIFIED POSTPROCEDURAL STATES: Chronic | ICD-10-CM

## 2022-05-20 DIAGNOSIS — Z20.828 CONTACT WITH AND (SUSPECTED) EXPOSURE TO OTHER VIRAL COMMUNICABLE DISEASES: ICD-10-CM

## 2022-05-20 DIAGNOSIS — Z96.641 PRESENCE OF RIGHT ARTIFICIAL HIP JOINT: Chronic | ICD-10-CM

## 2022-05-20 LAB — SARS-COV-2 RNA SPEC QL NAA+PROBE: SIGNIFICANT CHANGE UP

## 2022-05-20 PROCEDURE — U0005: CPT

## 2022-05-20 PROCEDURE — U0003: CPT

## 2022-05-23 ENCOUNTER — RESULT REVIEW (OUTPATIENT)
Age: 77
End: 2022-05-23

## 2022-05-23 ENCOUNTER — APPOINTMENT (OUTPATIENT)
Dept: ORTHOPEDIC SURGERY | Facility: HOSPITAL | Age: 77
End: 2022-05-23

## 2022-05-23 ENCOUNTER — TRANSCRIPTION ENCOUNTER (OUTPATIENT)
Age: 77
End: 2022-05-23

## 2022-05-23 ENCOUNTER — NON-APPOINTMENT (OUTPATIENT)
Age: 77
End: 2022-05-23

## 2022-05-23 ENCOUNTER — INPATIENT (INPATIENT)
Facility: HOSPITAL | Age: 77
LOS: 0 days | Discharge: HOME | DRG: 470 | End: 2022-05-24
Attending: ORTHOPAEDIC SURGERY | Admitting: ORTHOPAEDIC SURGERY
Payer: MEDICARE

## 2022-05-23 VITALS
HEIGHT: 68 IN | WEIGHT: 167.55 LBS | SYSTOLIC BLOOD PRESSURE: 127 MMHG | DIASTOLIC BLOOD PRESSURE: 62 MMHG | HEART RATE: 71 BPM | TEMPERATURE: 98 F | RESPIRATION RATE: 16 BRPM | OXYGEN SATURATION: 98 %

## 2022-05-23 DIAGNOSIS — M16.12 UNILATERAL PRIMARY OSTEOARTHRITIS, LEFT HIP: ICD-10-CM

## 2022-05-23 DIAGNOSIS — Z98.890 OTHER SPECIFIED POSTPROCEDURAL STATES: Chronic | ICD-10-CM

## 2022-05-23 DIAGNOSIS — Z96.641 PRESENCE OF RIGHT ARTIFICIAL HIP JOINT: Chronic | ICD-10-CM

## 2022-05-23 LAB
ANION GAP SERPL CALC-SCNC: 6 MMOL/L — SIGNIFICANT CHANGE UP (ref 5–17)
BUN SERPL-MCNC: 18 MG/DL — SIGNIFICANT CHANGE UP (ref 7–23)
CALCIUM SERPL-MCNC: 8.4 MG/DL — SIGNIFICANT CHANGE UP (ref 8.4–10.5)
CHLORIDE SERPL-SCNC: 108 MMOL/L — SIGNIFICANT CHANGE UP (ref 96–108)
CO2 SERPL-SCNC: 27 MMOL/L — SIGNIFICANT CHANGE UP (ref 22–31)
CREAT SERPL-MCNC: 1.01 MG/DL — SIGNIFICANT CHANGE UP (ref 0.5–1.3)
EGFR: 57 ML/MIN/1.73M2 — LOW
GLUCOSE SERPL-MCNC: 149 MG/DL — HIGH (ref 70–99)
HCT VFR BLD CALC: 34.8 % — SIGNIFICANT CHANGE UP (ref 34.5–45)
HGB BLD-MCNC: 11.2 G/DL — LOW (ref 11.5–15.5)
POTASSIUM SERPL-MCNC: 4.2 MMOL/L — SIGNIFICANT CHANGE UP (ref 3.5–5.3)
POTASSIUM SERPL-SCNC: 4.2 MMOL/L — SIGNIFICANT CHANGE UP (ref 3.5–5.3)
SODIUM SERPL-SCNC: 141 MMOL/L — SIGNIFICANT CHANGE UP (ref 135–145)

## 2022-05-23 PROCEDURE — 88305 TISSUE EXAM BY PATHOLOGIST: CPT | Mod: 26

## 2022-05-23 PROCEDURE — 27130 TOTAL HIP ARTHROPLASTY: CPT | Mod: LT

## 2022-05-23 PROCEDURE — 99222 1ST HOSP IP/OBS MODERATE 55: CPT

## 2022-05-23 PROCEDURE — 88311 DECALCIFY TISSUE: CPT | Mod: 26

## 2022-05-23 DEVICE — HEAD FEM OPTION CERAMIC DELTA 36MM: Type: IMPLANTABLE DEVICE | Site: LEFT | Status: FUNCTIONAL

## 2022-05-23 DEVICE — LINER ACET EMPOWR HXE PLUS NEUT 36MM ALPHA F: Type: IMPLANTABLE DEVICE | Site: LEFT | Status: FUNCTIONAL

## 2022-05-23 DEVICE — SLEEVE BIOLOX DELTA OPTION NEUTRAL: Type: IMPLANTABLE DEVICE | Site: LEFT | Status: FUNCTIONAL

## 2022-05-23 DEVICE — CUP ACET EMPOWR CLUSTER 52MM ALPHA F: Type: IMPLANTABLE DEVICE | Site: LEFT | Status: FUNCTIONAL

## 2022-05-23 DEVICE — BONE WAX 2.5GM: Type: IMPLANTABLE DEVICE | Site: LEFT | Status: FUNCTIONAL

## 2022-05-23 DEVICE — KWIRE THREADED 4.8MM 6/PK: Type: IMPLANTABLE DEVICE | Site: LEFT | Status: FUNCTIONAL

## 2022-05-23 DEVICE — SCREW ACET SZ 6.5X30MM: Type: IMPLANTABLE DEVICE | Site: LEFT | Status: FUNCTIONAL

## 2022-05-23 DEVICE — STEM FEM P2 LAT OFFSET SZ 14: Type: IMPLANTABLE DEVICE | Site: LEFT | Status: FUNCTIONAL

## 2022-05-23 RX ORDER — CEFAZOLIN SODIUM 1 G
2000 VIAL (EA) INJECTION EVERY 8 HOURS
Refills: 0 | Status: COMPLETED | OUTPATIENT
Start: 2022-05-23 | End: 2022-05-24

## 2022-05-23 RX ORDER — ONDANSETRON 8 MG/1
4 TABLET, FILM COATED ORAL ONCE
Refills: 0 | Status: DISCONTINUED | OUTPATIENT
Start: 2022-05-23 | End: 2022-05-23

## 2022-05-23 RX ORDER — ACETAMINOPHEN 500 MG
1000 TABLET ORAL ONCE
Refills: 0 | Status: COMPLETED | OUTPATIENT
Start: 2022-05-23 | End: 2022-05-23

## 2022-05-23 RX ORDER — APREPITANT 80 MG/1
40 CAPSULE ORAL ONCE
Refills: 0 | Status: COMPLETED | OUTPATIENT
Start: 2022-05-23 | End: 2022-05-23

## 2022-05-23 RX ORDER — OXYCODONE HYDROCHLORIDE 5 MG/1
5 TABLET ORAL
Refills: 0 | Status: DISCONTINUED | OUTPATIENT
Start: 2022-05-23 | End: 2022-05-24

## 2022-05-23 RX ORDER — HYDROMORPHONE HYDROCHLORIDE 2 MG/ML
0.5 INJECTION INTRAMUSCULAR; INTRAVENOUS; SUBCUTANEOUS ONCE
Refills: 0 | Status: DISCONTINUED | OUTPATIENT
Start: 2022-05-23 | End: 2022-05-24

## 2022-05-23 RX ORDER — CHLORHEXIDINE GLUCONATE 213 G/1000ML
1 SOLUTION TOPICAL ONCE
Refills: 0 | Status: COMPLETED | OUTPATIENT
Start: 2022-05-23 | End: 2022-05-23

## 2022-05-23 RX ORDER — DEXAMETHASONE 0.5 MG/5ML
8 ELIXIR ORAL ONCE
Refills: 0 | Status: COMPLETED | OUTPATIENT
Start: 2022-05-24 | End: 2022-05-24

## 2022-05-23 RX ORDER — SODIUM CHLORIDE 9 MG/ML
500 INJECTION INTRAMUSCULAR; INTRAVENOUS; SUBCUTANEOUS ONCE
Refills: 0 | Status: COMPLETED | OUTPATIENT
Start: 2022-05-23 | End: 2022-05-23

## 2022-05-23 RX ORDER — TRANEXAMIC ACID 100 MG/ML
1000 INJECTION, SOLUTION INTRAVENOUS ONCE
Refills: 0 | Status: COMPLETED | OUTPATIENT
Start: 2022-05-23 | End: 2022-05-23

## 2022-05-23 RX ORDER — POLYETHYLENE GLYCOL 3350 17 G/17G
17 POWDER, FOR SOLUTION ORAL AT BEDTIME
Refills: 0 | Status: DISCONTINUED | OUTPATIENT
Start: 2022-05-23 | End: 2022-05-24

## 2022-05-23 RX ORDER — HYDROMORPHONE HYDROCHLORIDE 2 MG/ML
0.5 INJECTION INTRAMUSCULAR; INTRAVENOUS; SUBCUTANEOUS
Refills: 0 | Status: DISCONTINUED | OUTPATIENT
Start: 2022-05-23 | End: 2022-05-24

## 2022-05-23 RX ORDER — SODIUM CHLORIDE 9 MG/ML
1000 INJECTION, SOLUTION INTRAVENOUS
Refills: 0 | Status: DISCONTINUED | OUTPATIENT
Start: 2022-05-23 | End: 2022-05-24

## 2022-05-23 RX ORDER — PANTOPRAZOLE SODIUM 20 MG/1
40 TABLET, DELAYED RELEASE ORAL
Refills: 0 | Status: DISCONTINUED | OUTPATIENT
Start: 2022-05-23 | End: 2022-05-24

## 2022-05-23 RX ORDER — ACETAMINOPHEN 500 MG
1000 TABLET ORAL EVERY 8 HOURS
Refills: 0 | Status: DISCONTINUED | OUTPATIENT
Start: 2022-05-24 | End: 2022-05-24

## 2022-05-23 RX ORDER — SENNA PLUS 8.6 MG/1
2 TABLET ORAL AT BEDTIME
Refills: 0 | Status: DISCONTINUED | OUTPATIENT
Start: 2022-05-23 | End: 2022-05-24

## 2022-05-23 RX ORDER — APIXABAN 2.5 MG/1
2.5 TABLET, FILM COATED ORAL EVERY 12 HOURS
Refills: 0 | Status: DISCONTINUED | OUTPATIENT
Start: 2022-05-24 | End: 2022-05-24

## 2022-05-23 RX ORDER — CELECOXIB 200 MG/1
200 CAPSULE ORAL EVERY 12 HOURS
Refills: 0 | Status: DISCONTINUED | OUTPATIENT
Start: 2022-05-23 | End: 2022-05-24

## 2022-05-23 RX ORDER — AMLODIPINE BESYLATE 2.5 MG/1
5 TABLET ORAL DAILY
Refills: 0 | Status: DISCONTINUED | OUTPATIENT
Start: 2022-05-25 | End: 2022-05-24

## 2022-05-23 RX ORDER — METOPROLOL TARTRATE 50 MG
25 TABLET ORAL DAILY
Refills: 0 | Status: DISCONTINUED | OUTPATIENT
Start: 2022-05-23 | End: 2022-05-24

## 2022-05-23 RX ORDER — CEFAZOLIN SODIUM 1 G
2000 VIAL (EA) INJECTION ONCE
Refills: 0 | Status: COMPLETED | OUTPATIENT
Start: 2022-05-23 | End: 2022-05-23

## 2022-05-23 RX ORDER — SODIUM CHLORIDE 9 MG/ML
1000 INJECTION, SOLUTION INTRAVENOUS
Refills: 0 | Status: DISCONTINUED | OUTPATIENT
Start: 2022-05-23 | End: 2022-05-23

## 2022-05-23 RX ORDER — MAGNESIUM HYDROXIDE 400 MG/1
30 TABLET, CHEWABLE ORAL DAILY
Refills: 0 | Status: DISCONTINUED | OUTPATIENT
Start: 2022-05-23 | End: 2022-05-24

## 2022-05-23 RX ORDER — OXYCODONE HYDROCHLORIDE 5 MG/1
10 TABLET ORAL
Refills: 0 | Status: DISCONTINUED | OUTPATIENT
Start: 2022-05-23 | End: 2022-05-24

## 2022-05-23 RX ORDER — ONDANSETRON 8 MG/1
4 TABLET, FILM COATED ORAL EVERY 6 HOURS
Refills: 0 | Status: DISCONTINUED | OUTPATIENT
Start: 2022-05-23 | End: 2022-05-24

## 2022-05-23 RX ADMIN — SODIUM CHLORIDE 100 MILLILITER(S): 9 INJECTION, SOLUTION INTRAVENOUS at 17:29

## 2022-05-23 RX ADMIN — Medication 400 MILLIGRAM(S): at 17:29

## 2022-05-23 RX ADMIN — SODIUM CHLORIDE 500 MILLILITER(S): 9 INJECTION INTRAMUSCULAR; INTRAVENOUS; SUBCUTANEOUS at 17:29

## 2022-05-23 RX ADMIN — Medication 1000 MILLIGRAM(S): at 18:17

## 2022-05-23 RX ADMIN — HYDROMORPHONE HYDROCHLORIDE 0.5 MILLIGRAM(S): 2 INJECTION INTRAMUSCULAR; INTRAVENOUS; SUBCUTANEOUS at 13:28

## 2022-05-23 RX ADMIN — SODIUM CHLORIDE 500 MILLILITER(S): 9 INJECTION INTRAMUSCULAR; INTRAVENOUS; SUBCUTANEOUS at 13:17

## 2022-05-23 RX ADMIN — APREPITANT 40 MILLIGRAM(S): 80 CAPSULE ORAL at 08:28

## 2022-05-23 RX ADMIN — SODIUM CHLORIDE 75 MILLILITER(S): 9 INJECTION, SOLUTION INTRAVENOUS at 13:16

## 2022-05-23 RX ADMIN — Medication 400 MILLIGRAM(S): at 23:58

## 2022-05-23 RX ADMIN — HYDROMORPHONE HYDROCHLORIDE 0.5 MILLIGRAM(S): 2 INJECTION INTRAMUSCULAR; INTRAVENOUS; SUBCUTANEOUS at 13:57

## 2022-05-23 RX ADMIN — CHLORHEXIDINE GLUCONATE 1 APPLICATION(S): 213 SOLUTION TOPICAL at 08:28

## 2022-05-23 RX ADMIN — CELECOXIB 200 MILLIGRAM(S): 200 CAPSULE ORAL at 20:34

## 2022-05-23 RX ADMIN — CELECOXIB 200 MILLIGRAM(S): 200 CAPSULE ORAL at 21:01

## 2022-05-23 RX ADMIN — Medication 100 MILLIGRAM(S): at 17:30

## 2022-05-23 NOTE — PHYSICAL THERAPY INITIAL EVALUATION ADULT - ADDITIONAL COMMENTS
Pt lives in a first floor apt with 4-5 steps to enter with bilateral rail.  No steps inside.  Pt has rolling walker, rollator and straight cane.  Pt reports sister lives upstairs.  Pt reports sister or niece will assist upon d/c

## 2022-05-23 NOTE — DISCHARGE NOTE PROVIDER - NSDCCPCAREPLAN_GEN_ALL_CORE_FT
PRINCIPAL DISCHARGE DIAGNOSIS  Diagnosis: Status post left hip replacement  Assessment and Plan of Treatment: TOTAL HIP PRECAUTIONS  *Remember to continue all of the precautions for total hip replacement. Your surgeon will tell you when and if you can move beyond these limitations.  • Do not raise your operated leg up with your knee straight.  • Avoid sitting in low, soft chairs, such as sofas, easy chairs etc.   • Do not take a tub bath yet.   • Do not resume driving until you have your surgeon’s permission.  Wound Care  • Keep your incision clean and dry. do not remove the inner tape until you go to the office in 2 weeks. You can remove the other bandage in 1 week  • You may use an icee pack for 20 minutes on and 20 minutes off to decrease pain and swelling.  Medication  • Elequis for 35 days total.  • Follow up with your Primary Care Physician within 2 weeks from arrival home for examination and blood work.

## 2022-05-23 NOTE — CONSULT NOTE ADULT - SUBJECTIVE AND OBJECTIVE BOX
HPI:  75 y/o female pmh htn is s/p Left THR.  Prior to the procedure, pt was having left hip and back pain for years.  She had to have a kyphoplasty low back after a fall 2/22.  While she was in rehab she began having increased back pain which turned out to be related to the hip problem.  Imaging tests showed bone on bone.  She was advised to have L THR.  Pt is currently looking forward to recovery.    REVIEW OF SYSTEMS:  CONSTITUTIONAL: No fever, weight loss, or fatigue  RESPIRATORY: No cough, wheezing, chills or hemoptysis; No shortness of breath  CARDIOVASCULAR: No chest pain, palpitations, dizziness, or leg swelling  GASTROINTESTINAL: No abdominal or epigastric pain. No nausea, vomiting, or hematemesis; No diarrhea or constipation. No melena or hematochezia.  GENITOURINARY: No dysuria, frequency, hematuria, or incontinence  NEUROLOGICAL: No headaches, memory loss, loss of strength, numbness, or tremors  MUSCULOSKELETAL: No muscle or back pain  PSYCHIATRIC: No depression, anxiety, mood swings, or difficulty sleeping      PAST MEDICAL & SURGICAL HISTORY:  HTN (hypertension)      Basal cell carcinoma  face, left leg and back)      Osteoarthrosis of hip and thigh region  right and left      COPD (chronic obstructive pulmonary disease)  currently not taking any medications      Bronchitis      Stroke  2020-poor memory      Hx of appendectomy  @ 14 yrs old      Benign breast lumps  bilateral - 15 yrs ago      Hx of cataract surgery  bilateral - 2007 IOL      Status post eye surgery  laser left eye - 05/2013      S/P total right hip arthroplasty  2013      S/P kyphoplasty  lumbar 2/22-fractures after fall          SOCIAL HISTORY:  Deniest Tobacco  Denies Etoh  Denies Drugs      Allergies    No Known Allergies    Intolerances        MEDICATIONS  (STANDING):  acetaminophen   IVPB .. 1000 milliGRAM(s) IV Intermittent once  acetaminophen   IVPB .. 1000 milliGRAM(s) IV Intermittent once  ceFAZolin   IVPB 2000 milliGRAM(s) IV Intermittent every 8 hours  celecoxib 200 milliGRAM(s) Oral every 12 hours  lactated ringers. 1000 milliLiter(s) (100 mL/Hr) IV Continuous <Continuous>  metoprolol succinate ER 25 milliGRAM(s) Oral daily  pantoprazole    Tablet 40 milliGRAM(s) Oral before breakfast  polyethylene glycol 3350 17 Gram(s) Oral at bedtime  senna 2 Tablet(s) Oral at bedtime  sodium chloride 0.9% Bolus 500 milliLiter(s) IV Bolus once    MEDICATIONS  (PRN):  HYDROmorphone  Injectable 0.5 milliGRAM(s) IV Push once PRN breakthrough pain  HYDROmorphone  Injectable 0.5 milliGRAM(s) IV Push every 10 minutes PRN Moderate Pain (4 - 6)  magnesium hydroxide Suspension 30 milliLiter(s) Oral daily PRN Constipation  ondansetron Injectable 4 milliGRAM(s) IV Push every 6 hours PRN Nausea and/or Vomiting  oxyCODONE    IR 5 milliGRAM(s) Oral every 3 hours PRN Moderate Pain (4 - 6)  oxyCODONE    IR 10 milliGRAM(s) Oral every 3 hours PRN Severe Pain (7 - 10)      FAMILY HISTORY:  FH: lung cancer (Father)    FH: ovarian cancer (Mother)        Vital Signs Last 24 Hrs  T(C): 36.4 (23 May 2022 14:14), Max: 36.8 (23 May 2022 08:00)  T(F): 97.5 (23 May 2022 14:14), Max: 98.3 (23 May 2022 08:00)  HR: 69 (23 May 2022 14:14) (64 - 71)  BP: 106/43 (23 May 2022 14:14) (101/79 - 127/62)  BP(mean): --  RR: 15 (23 May 2022 14:14) (10 - 20)  SpO2: 100% (23 May 2022 14:14) (98% - 100%)    PHYSICAL EXAM:    GENERAL: NAD, well-groomed, well-developed  HEAD:  Atraumatic, Normocephalic  EYES: EOMI, PERRLA, conjunctiva and sclera clear  ENMT: No tonsillar erythema, exudates, or enlargement; Moist mucous membranes, Good dentition, No lesions  NECK: Supple, No JVD, Normal thyroid  NERVOUS SYSTEM:  Alert & Oriented X3, Good concentration; Moving all 4 extremities; No gross sensory deficits  CHEST/LUNG: Clear to auscultation bilaterally; No rales, rhonchi, wheezing, or rubs  HEART: Regular rate and rhythm; No murmurs, rubs, or gallops  ABDOMEN: Soft, Nontender, Nondistended; Bowel sounds present  EXTREMITIES:  2+ Peripheral Pulses, No clubbing, cyanosis, or edema  SKIN: No rashes or lesions  INCISION: intact    Labs: Pending

## 2022-05-23 NOTE — PHYSICAL THERAPY INITIAL EVALUATION ADULT - MANUAL MUSCLE TESTING RESULTS, REHAB EVAL
Mildly to Moderately Impaired: difficulty hearing in some environments or speaker may need to increase volume or speak distinctly/bilateral hearing aids
BLEs grossly 4/5/grossly assessed due to

## 2022-05-23 NOTE — PATIENT PROFILE ADULT - FALL HARM RISK - UNIVERSAL INTERVENTIONS
Bed in lowest position, wheels locked, appropriate side rails in place/Call bell, personal items and telephone in reach/Instruct patient to call for assistance before getting out of bed or chair/Non-slip footwear when patient is out of bed/[place] to call system/Physically safe environment - no spills, clutter or unnecessary equipment/Purposeful Proactive Rounding/Room/bathroom lighting operational, light cord in reach

## 2022-05-23 NOTE — PATIENT PROFILE ADULT - VISION (WITH CORRECTIVE LENSES IF THE PATIENT USUALLY WEARS THEM):
4 = completely dependent
Normal vision: sees adequately in most situations; can see medication labels, newsprint

## 2022-05-23 NOTE — OCCUPATIONAL THERAPY INITIAL EVALUATION ADULT - PLANNED THERAPY INTERVENTIONS, OT EVAL
SUBJECTIVE:  The patient is a 63 year old female who presented requesting evaluation of a four-day history of itchy bug bites to both lower legs.  Patient states she was camping for the last week and had noticed bug bites over both legs and arms over the last four days.  They have been quite itchy but nonpainful and there has been no discharge or vesicle formation.  Denies any known tick bites.  Denies any joint pain, fever, chills or other symptoms.  She was using Benadryl which helps temporarily with her symptoms.  Denies any spreading erythema, streaking or tissue swelling.  Patient states that she typically experiences bug bites went camping though these seem particularly pruritic.  She also tried applying a Benadryl cream which has helped somewhat.  She notes fever lesions on the arms and back which are not pruritic or symptomatic this time.    OBJECTIVE:  PAST HISTORIES:  I have reviewed the past medical history, family history, social history, medications and allergies listed in the medical record as well as the nursing notes for this encounter.    PROBLEM LIST:  There is no problem list on file for this patient.      ALLERGIES:   Reviewed and updated in the EHR.    MEDICATIONS:   Reviewed and updated in the EHR.    REVIEW OF SYSTEMS:   Positive for:  Itchy bug bites to legs.     Negative for: Fevers, chills, weight loss, change in vision, paresthesias, nausea, vomiting, lightheadedness, dizziness, shortness of breath, cough, chest pain, rashes, skin erythema, induration, joint pain, changes in bowel habit, changes in urinary habit, changes in mood.      PHYSICAL EXAM:  Visit Vitals  /84   Pulse 99   Temp 98.4 °F (36.9 °C) (Temporal)   Wt 69 kg   SpO2 96%   BMI 27.82 kg/m²     Constitutional:  Well-developed, well-nourished female in no acute distress.    Eye:  Normal conjunctivae.    Respiratory:  Lungs are clear to auscultation, respirations are non-labored,  breath sounds are equal.       Cardiovascular:  Normal rate, regular rhythm, no murmur, normal peripheral  perfusion, no edema.      Integumentary:  Warm, dry, pink.  There are multiple circular and raised lesions noted over both lower legs and fewer over the arms.  They are described as pruritic.  No vesicular lesions, weeping or discharge from the sites.  No streaking erythema or surrounding edema.    Neurologic:  Alert, oriented times 3, Normal sensory, normal motor function, no focal defects.      Psychiatric:  Cooperative, appropriate mood and affect, normal judgment.         Assessment:  1. Bug bite, initial encounter        Plan:  Orders Placed This Encounter   • mometasone (Elocon) 0.1 % cream     History and examination today is consistent with insect bites.  No signs of infection or findings to suggest a tick bite.  I discussed my findings with her and recommended supportive care for now.  She may apply cool compresses to the symptomatic lesions and continued use Benadryl for the pruritus.  I have sent a prescription for Elocon cream to apply to these areas.  I advised her to monitor the site for signs of infection that would require re-evaluation.  Patient understands and agrees with plan.    None  No follow-ups on file.  Instructions noted per AVS/patient instructions.  The patient indicates understanding of these issues and agrees with the plan.      ADL retraining/bed mobility training/transfer training

## 2022-05-23 NOTE — BRIEF OPERATIVE NOTE - NSICDXBRIEFPREOP_GEN_ALL_CORE_FT
PRE-OP DIAGNOSIS:  Primary localized osteoarthritis of left hip 23-May-2022 12:52:08  Manohar Okeefe

## 2022-05-23 NOTE — PROGRESS NOTE ADULT - SUBJECTIVE AND OBJECTIVE BOX
DAVID HARRISON [id number]    Pt is a 76y year old Female s/p left anterior THR. Pain is isolated to left hip, has worsened to a moderate level after ambulation. Does not want pain medications at this time. Tolerating regular diet, (+) voids.  Denies chest pain/shortness of breath/nausea/vomitting.     Vital Signs Last 24 Hrs  T(C): 36.8 (23 May 2022 15:11), Max: 36.8 (23 May 2022 08:00)  T(F): 98.2 (23 May 2022 15:11), Max: 98.3 (23 May 2022 08:00)  HR: 76 (23 May 2022 15:11) (64 - 76)  BP: 110/61 (23 May 2022 15:11) (101/79 - 127/62)  BP(mean): --  RR: 16 (23 May 2022 15:11) (10 - 20)  SpO2: 98% (23 May 2022 15:11) (98% - 100%)      05-23 @ 07:01  -  05-23 @ 16:05  --------------------------------------------------------  IN: 600 mL / OUT: 300 mL / NET: 300 mL                    PE:   LLE: DEANNA dressing CDI, Sensation intact to light touch distally, (+2) DP pulses, EHL/FHL/TA intact, Capillary refill < 2 seconds. Negative calf tenderness. PAS on.     A: 76y year old Female s/p left anterior THR     Plan:   -DVT ppx = PAS +  eliquis  -PT/OT = OOB, Anterior THR protocols  -Anterior THR dislocation precautions  -Pain control discussed with patient  She was advised to request pain medication if she felt she needed it.  She said she is not a "pill popper" and does not want narcotics.   -Medicine to follow   -Continue to Follow Labs  -Incentive spirometry encouraged  - Plan for home discharge tomorrow.

## 2022-05-23 NOTE — DISCHARGE NOTE PROVIDER - CARE PROVIDER_API CALL
Holli Vinson)  Orthopedics  [address] [facility], [facility] [address]  [place] [address]  Phone: [phone]  Fax: [phone]  Follow Up Time:

## 2022-05-23 NOTE — OCCUPATIONAL THERAPY INITIAL EVALUATION ADULT - LIVES WITH, PROFILE
Pt lives alone in an apartment, 4-5 steps to enter with a tub with grab bars. Pt utilized a cane for functional mobility/transfers prior to admission. Pt reports her sister lives upstairs and is able to assist prn./alone

## 2022-05-23 NOTE — CONSULT NOTE ADULT - ASSESSMENT
75 y/o female pmh htn is s/p Left THR    #S/P L THR  Post op orders per ortho  Pain management  Bowl regimen  Pt eval  Obtain baseline labs    #HTN  Continue home bp meds with hold parameters  Resume diuretics when discharged home.     #DVT proph  per ortho

## 2022-05-23 NOTE — OCCUPATIONAL THERAPY INITIAL EVALUATION ADULT - DIAGNOSIS, OT EVAL
Pt with impaired ROM, strength, balance, memory (baseline), impacting pt's ability to complete ADLs, IADLs, functional mobility/transfers.

## 2022-05-23 NOTE — DISCHARGE NOTE PROVIDER - NSDCFUSCHEDAPPT_GEN_ALL_CORE_FT
Holli Vinson  [facility]  Orthosurg [address]  Scheduled Appointment: 05/23/2022    [facility]  ORTHOSURG [address] [facility]  Scheduled Appointment: 06/06/2022    Holli Vinson  [facility]  ORTHOSURG [address] [facility]  Scheduled Appointment: 07/19/2022     [facility]  ORTHOSURG [address] [place] [address]  Scheduled Appointment: 06/06/2022    Holli Vinson  [facility]  ORTHOSURG [address] [place] [address]  Scheduled Appointment: 07/19/2022

## 2022-05-23 NOTE — DISCHARGE NOTE PROVIDER - HOSPITAL COURSE
The patient is a 76  y.o. female with severe osteoarthritis of the left hip.    After admission on 5/23/2022 and receiving pre-operative parenteral prophylactic antibiotics, the patient  underwent an  uncomplicated left total hip replacement by orthopedic surgeon Dr. Vinson.    A medical consultation from the Hospitalist service was obtained for post-operative medical co-management. Typical Physical & occupational therapy modalities post surgery were performed including ambulation training, range of motion, ADL's, abd transfers.  The patient had a clean appearing surgical incision with no sign of surgical site infections and had a stable neuro / vascular exam of the operated extremity.  After progression of mobility guided by the PT/ OT staff,  the patient was felt to benefit from further rehabilitative care for restoration to level of function. This was felt to best be accomplished in a ________________.  Discharge and Orthopedic Care instructions were delineated in the Discharge Plan and reviewed with the patient. All medications were delineated in the medication reconciliation tool and key points were reviewed with the patient. They were deemed stable from an Orthopedic & medical standpoint for discharge today.   The patient is a 76  y.o. female with severe osteoarthritis of the left hip.    After admission on 5/23/2022 and receiving pre-operative parenteral prophylactic antibiotics, the patient  underwent an  uncomplicated left total hip replacement by orthopedic surgeon Dr. Vinson.    A medical consultation from the Hospitalist service was obtained for post-operative medical co-management. Typical Physical & occupational therapy modalities post surgery were performed including ambulation training, range of motion, ADL's, abd transfers.  DEANNA dressing remains clean and dry with green light signal.  The patient had no sign of surgical site infections and had a stable neuro / vascular exam of the operated extremity.  After progression of mobility guided by the PT/ OT staff,  the patient was felt to benefit from further rehabilitative care for restoration to level of function. This was felt to best be accomplished in a home setting.  Discharge and Orthopedic Care instructions were delineated in the Discharge Plan and reviewed with the patient. All medications were delineated in the medication reconciliation tool and key points were reviewed with the patient. They were deemed stable from an Orthopedic & medical standpoint for discharge today.

## 2022-05-23 NOTE — OCCUPATIONAL THERAPY INITIAL EVALUATION ADULT - MD/RN NOTIFIED
Panel Management Review      Patient has the following on her problem list:   Depression / Dysthymia review    Measure:  Needs PHQ-9 score of 4 or less during index window.  Administer PHQ-9 and if score is 5 or more, send encounter to provider for next steps.    5 - 7 month window range: yes    No flowsheet data found.    If PHQ-9 recheck is 5 or more, route to provider for next steps.    Patient is due for:  PHQ9 and DAP      Composite cancer screening  Chart review shows that this patient is due/due soon for the following Pap Smear  Summary:    Patient is due/failing the following:   DAP, PAP and PHQ9    Action needed:   Patient needs office visit for pap smear.    Type of outreach:    Sent letter.    Questions for provider review:    None                                                                                                                                    Marilyn Vo MA                yes

## 2022-05-23 NOTE — DISCHARGE NOTE PROVIDER - NSDCMRMEDTOKEN_GEN_ALL_CORE_FT
amLODIPine 5 mg oral tablet: 1 tab(s) orally once a day  metoprolol succinate 25 mg oral tablet, extended release: 1 tab(s) orally once a day   acetaminophen 500 mg oral tablet: 2 tab(s) orally every 8 hours  amLODIPine 5 mg oral tablet: 1 tab(s) orally once a day  apixaban 2.5 mg oral tablet: 1 tab(s) orally every 12 hours  apixaban 2.5 mg oral tablet: 1 tab(s) orally every 12 hours  celecoxib 200 mg oral capsule: 1 cap(s) orally every 12 hours  metoprolol succinate 25 mg oral tablet, extended release: 1 tab(s) orally once a day  omeprazole 20 mg oral delayed release capsule: 1 cap(s) orally once a day  before breakfast   oxyCODONE 5 mg oral tablet: 1 tab(s) orally every 4 hours, As Needed -Moderate to severe Pain MDD:6   [id number]    polyethylene glycol 3350 oral powder for reconstitution: 17 gram(s) orally once a day (at bedtime)  senna oral tablet: 2 tab(s) orally once a day (at bedtime)

## 2022-05-23 NOTE — BRIEF OPERATIVE NOTE - NSICDXBRIEFPOSTOP_GEN_ALL_CORE_FT
POST-OP DIAGNOSIS:  Primary localized osteoarthritis of left hip 23-May-2022 12:53:41  Manohar Okeefe

## 2022-05-24 ENCOUNTER — TRANSCRIPTION ENCOUNTER (OUTPATIENT)
Age: 77
End: 2022-05-24

## 2022-05-24 ENCOUNTER — NON-APPOINTMENT (OUTPATIENT)
Age: 77
End: 2022-05-24

## 2022-05-24 VITALS
DIASTOLIC BLOOD PRESSURE: 62 MMHG | HEART RATE: 62 BPM | RESPIRATION RATE: 18 BRPM | TEMPERATURE: 98 F | OXYGEN SATURATION: 95 % | SYSTOLIC BLOOD PRESSURE: 112 MMHG

## 2022-05-24 LAB
ANION GAP SERPL CALC-SCNC: 7 MMOL/L — SIGNIFICANT CHANGE UP (ref 5–17)
BUN SERPL-MCNC: 19 MG/DL — SIGNIFICANT CHANGE UP (ref 7–23)
CALCIUM SERPL-MCNC: 8.6 MG/DL — SIGNIFICANT CHANGE UP (ref 8.4–10.5)
CHLORIDE SERPL-SCNC: 105 MMOL/L — SIGNIFICANT CHANGE UP (ref 96–108)
CO2 SERPL-SCNC: 26 MMOL/L — SIGNIFICANT CHANGE UP (ref 22–31)
CREAT SERPL-MCNC: 0.96 MG/DL — SIGNIFICANT CHANGE UP (ref 0.5–1.3)
EGFR: 61 ML/MIN/1.73M2 — SIGNIFICANT CHANGE UP
GLUCOSE SERPL-MCNC: 150 MG/DL — HIGH (ref 70–99)
HCT VFR BLD CALC: 31.5 % — LOW (ref 34.5–45)
HGB BLD-MCNC: 10.1 G/DL — LOW (ref 11.5–15.5)
MCHC RBC-ENTMCNC: 28.9 PG — SIGNIFICANT CHANGE UP (ref 27–34)
MCHC RBC-ENTMCNC: 32.1 GM/DL — SIGNIFICANT CHANGE UP (ref 32–36)
MCV RBC AUTO: 90 FL — SIGNIFICANT CHANGE UP (ref 80–100)
NRBC # BLD: 0 /100 WBCS — SIGNIFICANT CHANGE UP (ref 0–0)
PLATELET # BLD AUTO: 148 K/UL — LOW (ref 150–400)
POTASSIUM SERPL-MCNC: 4.3 MMOL/L — SIGNIFICANT CHANGE UP (ref 3.5–5.3)
POTASSIUM SERPL-SCNC: 4.3 MMOL/L — SIGNIFICANT CHANGE UP (ref 3.5–5.3)
RBC # BLD: 3.5 M/UL — LOW (ref 3.8–5.2)
RBC # FLD: 13.7 % — SIGNIFICANT CHANGE UP (ref 10.3–14.5)
SODIUM SERPL-SCNC: 138 MMOL/L — SIGNIFICANT CHANGE UP (ref 135–145)
WBC # BLD: 10.12 K/UL — SIGNIFICANT CHANGE UP (ref 3.8–10.5)
WBC # FLD AUTO: 10.12 K/UL — SIGNIFICANT CHANGE UP (ref 3.8–10.5)

## 2022-05-24 PROCEDURE — C1889: CPT

## 2022-05-24 PROCEDURE — C9399: CPT

## 2022-05-24 PROCEDURE — 97165 OT EVAL LOW COMPLEX 30 MIN: CPT

## 2022-05-24 PROCEDURE — 88311 DECALCIFY TISSUE: CPT

## 2022-05-24 PROCEDURE — 76000 FLUOROSCOPY <1 HR PHYS/QHP: CPT

## 2022-05-24 PROCEDURE — 97161 PT EVAL LOW COMPLEX 20 MIN: CPT

## 2022-05-24 PROCEDURE — 85018 HEMOGLOBIN: CPT

## 2022-05-24 PROCEDURE — 85014 HEMATOCRIT: CPT

## 2022-05-24 PROCEDURE — C1713: CPT

## 2022-05-24 PROCEDURE — 97535 SELF CARE MNGMENT TRAINING: CPT

## 2022-05-24 PROCEDURE — 97530 THERAPEUTIC ACTIVITIES: CPT

## 2022-05-24 PROCEDURE — 97116 GAIT TRAINING THERAPY: CPT

## 2022-05-24 PROCEDURE — 85027 COMPLETE CBC AUTOMATED: CPT

## 2022-05-24 PROCEDURE — 97110 THERAPEUTIC EXERCISES: CPT

## 2022-05-24 PROCEDURE — 99233 SBSQ HOSP IP/OBS HIGH 50: CPT

## 2022-05-24 PROCEDURE — 36415 COLL VENOUS BLD VENIPUNCTURE: CPT

## 2022-05-24 PROCEDURE — 88305 TISSUE EXAM BY PATHOLOGIST: CPT

## 2022-05-24 PROCEDURE — 80048 BASIC METABOLIC PNL TOTAL CA: CPT

## 2022-05-24 PROCEDURE — C1776: CPT

## 2022-05-24 RX ORDER — POLYETHYLENE GLYCOL 3350 17 G/17G
17 POWDER, FOR SOLUTION ORAL
Qty: 0 | Refills: 0 | DISCHARGE
Start: 2022-05-24

## 2022-05-24 RX ORDER — APIXABAN 2.5 MG/1
1 TABLET, FILM COATED ORAL
Qty: 9 | Refills: 0
Start: 2022-05-24

## 2022-05-24 RX ORDER — APIXABAN 2.5 MG/1
1 TABLET, FILM COATED ORAL
Qty: 60 | Refills: 0
Start: 2022-05-24 | End: 2022-06-22

## 2022-05-24 RX ORDER — OMEPRAZOLE 10 MG/1
1 CAPSULE, DELAYED RELEASE ORAL
Qty: 30 | Refills: 1
Start: 2022-05-24 | End: 2022-07-22

## 2022-05-24 RX ORDER — OXYCODONE HYDROCHLORIDE 5 MG/1
1 TABLET ORAL
Qty: 30 | Refills: 0
Start: 2022-05-24 | End: 2022-05-28

## 2022-05-24 RX ORDER — ACETAMINOPHEN 500 MG
2 TABLET ORAL
Qty: 0 | Refills: 0 | DISCHARGE
Start: 2022-05-24

## 2022-05-24 RX ORDER — CELECOXIB 200 MG/1
1 CAPSULE ORAL
Qty: 60 | Refills: 0
Start: 2022-05-24 | End: 2022-06-22

## 2022-05-24 RX ORDER — MELOXICAM 15 MG/1
1 TABLET ORAL
Qty: 21 | Refills: 0
Start: 2022-05-24 | End: 2022-06-13

## 2022-05-24 RX ORDER — SENNA PLUS 8.6 MG/1
2 TABLET ORAL
Qty: 0 | Refills: 0 | DISCHARGE
Start: 2022-05-24

## 2022-05-24 RX ADMIN — Medication 1000 MILLIGRAM(S): at 00:01

## 2022-05-24 RX ADMIN — Medication 1000 MILLIGRAM(S): at 06:32

## 2022-05-24 RX ADMIN — Medication 1000 MILLIGRAM(S): at 14:16

## 2022-05-24 RX ADMIN — CELECOXIB 200 MILLIGRAM(S): 200 CAPSULE ORAL at 09:05

## 2022-05-24 RX ADMIN — Medication 100 MILLIGRAM(S): at 01:51

## 2022-05-24 RX ADMIN — OXYCODONE HYDROCHLORIDE 10 MILLIGRAM(S): 5 TABLET ORAL at 09:32

## 2022-05-24 RX ADMIN — APIXABAN 2.5 MILLIGRAM(S): 2.5 TABLET, FILM COATED ORAL at 09:05

## 2022-05-24 RX ADMIN — Medication 25 MILLIGRAM(S): at 05:43

## 2022-05-24 RX ADMIN — Medication 1000 MILLIGRAM(S): at 05:44

## 2022-05-24 RX ADMIN — Medication 101.6 MILLIGRAM(S): at 06:32

## 2022-05-24 RX ADMIN — Medication 1000 MILLIGRAM(S): at 15:14

## 2022-05-24 RX ADMIN — PANTOPRAZOLE SODIUM 40 MILLIGRAM(S): 20 TABLET, DELAYED RELEASE ORAL at 05:43

## 2022-05-24 NOTE — PHARMACOTHERAPY INTERVENTION NOTE - COMMENTS
Transition of Care video discharge education - medication calendar given to patient Pharmacy fill confirmed.

## 2022-05-24 NOTE — PROGRESS NOTE ADULT - SUBJECTIVE AND OBJECTIVE BOX
Patient is a 76y old  Female who presents with a chief complaint of Left hip pain (24 May 2022 07:19)      INTERVAL HPI/OVERNIGHT EVENTS:    no overnight events    MEDICATIONS  (STANDING):  acetaminophen     Tablet .. 1000 milliGRAM(s) Oral every 8 hours  apixaban 2.5 milliGRAM(s) Oral every 12 hours  celecoxib 200 milliGRAM(s) Oral every 12 hours  lactated ringers. 1000 milliLiter(s) (100 mL/Hr) IV Continuous <Continuous>  metoprolol succinate ER 25 milliGRAM(s) Oral daily  pantoprazole    Tablet 40 milliGRAM(s) Oral before breakfast  polyethylene glycol 3350 17 Gram(s) Oral at bedtime  senna 2 Tablet(s) Oral at bedtime    MEDICATIONS  (PRN):  HYDROmorphone  Injectable 0.5 milliGRAM(s) IV Push once PRN breakthrough pain  magnesium hydroxide Suspension 30 milliLiter(s) Oral daily PRN Constipation  ondansetron Injectable 4 milliGRAM(s) IV Push every 6 hours PRN Nausea and/or Vomiting  oxyCODONE    IR 5 milliGRAM(s) Oral every 3 hours PRN Moderate Pain (4 - 6)  oxyCODONE    IR 10 milliGRAM(s) Oral every 3 hours PRN Severe Pain (7 - 10)      Allergies    No Known Allergies    Intolerances        REVIEW OF SYSTEMS:  CONSTITUTIONAL: No fever, weight loss, or fatigue  EYES: No eye pain, visual disturbances, or discharge  ENMT:  No difficulty hearing, tinnitus, vertigo; No sinus or throat pain  NECK: No pain or stiffness  BREASTS: No pain, masses, or nipple discharge  RESPIRATORY: No cough, wheezing, chills or hemoptysis; No shortness of breath  CARDIOVASCULAR: No chest pain, palpitations, lightheadedness, or leg swelling  GASTROINTESTINAL: No abdominal or epigastric pain. No nausea, vomiting, or hematemesis; No diarrhea or constipation. No melena or hematochezia.  GENITOURINARY: No dysuria, frequency, hematuria, or incontinence  NEUROLOGICAL: No headaches, memory loss, vertigo, loss of strength, numbness, or tremors  SKIN: No itching, burning, rashes, or lesions   LYMPH NODES: No enlarged glands  ENDOCRINE: No heat or cold intolerance; No hair loss; No polydipsia or polyuria  MUSCULOSKELETAL: No joint pain or swelling; No muscle, back, or extremity pain  PSYCHIATRIC: No depression, anxiety, or mood swings  HEME/LYMPH: No easy bruising, or bleeding gums  ALLERGY AND IMMUNOLOGIC: No hives or eczema    Vital Signs Last 24 Hrs  T(C): 36.4 (24 May 2022 07:48), Max: 37.1 (23 May 2022 23:00)  T(F): 97.5 (24 May 2022 07:48), Max: 98.7 (23 May 2022 23:00)  HR: 70 (24 May 2022 07:48) (59 - 76)  BP: 129/61 (24 May 2022 07:48) (101/79 - 134/62)  BP(mean): --  RR: 18 (24 May 2022 07:48) (10 - 18)  SpO2: 93% (24 May 2022 07:48) (93% - 100%)    PHYSICAL EXAM:  GENERAL: NAD, well-groomed, well-developed  HEAD:  Atraumatic, Normocephalic  EYES: EOMI, PERRLA, conjunctiva and sclera clear  ENMT: Moist mucous membranes, No lesions; No tonsillar erythema, exudates, or enlargement  NECK: Supple, No JVD, Normal thyroid  NERVOUS SYSTEM:  Alert & Oriented X3, Good concentration; All 4 extremities mobile, no gross sensory deficits.   CHEST/LUNG: Clear to auscultation bilaterally; No rales, rhonchi, wheezing, or rubs  HEART: Regular rate and rhythm; No murmurs, rubs, or gallops  ABDOMEN: Soft, Nontender, Nondistended; Bowel sounds present  EXTREMITIES:  2+ Peripheral Pulses, No clubbing, cyanosis, or edema  LYMPH: No lymphadenopathy noted  SKIN: No rashes or lesions    LABS:                        10.1   10.12 )-----------( 148      ( 24 May 2022 07:53 )             31.5     24 May 2022 07:53    138    |  105    |  19     ----------------------------<  150    4.3     |  26     |  0.96     Ca    8.6        24 May 2022 07:53          CAPILLARY BLOOD GLUCOSE          RADIOLOGY & ADDITIONAL TESTS:

## 2022-05-24 NOTE — PROGRESS NOTE ADULT - SUBJECTIVE AND OBJECTIVE BOX
DAVID HARRISON [id number]    Pt is a 76y year old Female s/p left anterior THR. Pain is minimal at rest. Tolerating regular diet, (+) voids.  Had a bout of nausea early this am but now feels well enough to eat breakfast.  Denies chest pain/shortness of breath/nausea/vomitting.     Vital Signs Last 24 Hrs  T(C): 36.6 (24 May 2022 03:00), Max: 37.1 (23 May 2022 23:00)  T(F): 97.8 (24 May 2022 03:00), Max: 98.7 (23 May 2022 23:00)  HR: 59 (24 May 2022 05:42) (59 - 76)  BP: 109/64 (24 May 2022 05:42) (101/79 - 134/62)  BP(mean): --  RR: 16 (24 May 2022 03:00) (10 - 20)  SpO2: 95% (24 May 2022 03:00) (95% - 100%)      05-23 @ 07:01  -  05-24 @ 07:00  --------------------------------------------------------  IN: 600 mL / OUT: 300 mL / NET: 300 mL                              11.2   x     )-----------( x        ( 23 May 2022 16:06 )             34.8     05-23    141  |  108  |  18  ----------------------------<  149<H>  4.2   |  27  |  1.01    Ca    8.4      23 May 2022 16:06          PE:   LLE: DEANNA dressing CDI, Sensation intact to light touch distally,  numbness of anterior thigh distal to incision.  (+2) DP pulses, EHL/FHL/TA intact, Capillary refill < 2 seconds. Negative calf tenderness. PAS on.     A: 76y year old Female s/p left THR POD#1    Plan:   -DVT ppx = PAS +  apixaban 2.5 milliGRAM(s) Oral every 12 hours for 35 days then transition to aspirin.    -PT/OT = OOB,  THR protocols.  -Anterior hip dislocation precautions  -Pain control adequate  -Medicine to follow   -Continue to Follow Labs- AM labs pending  -Incentive spirometry  -Plan for home discharge today

## 2022-05-24 NOTE — DISCHARGE NOTE NURSING/CASE MANAGEMENT/SOCIAL WORK - PATIENT PORTAL LINK FT
You can access the FollowMyHealth Patient Portal offered by [facility] by registering at the following website: [email or web address]. By joining [employer]’s FollowMyHealth portal, you will also be able to view your health information using other applications (apps) compatible with our system.

## 2022-05-24 NOTE — DISCHARGE NOTE NURSING/CASE MANAGEMENT/SOCIAL WORK - NSSCTYPOFSERV_GEN_ALL_CORE
[facility] - [phone]/ [phone]  Registered Nurse to visit the day after hospital discharge; Physical Therapist to follow. Please contact the home care agency at the above phone number if you have not heard from them by 12 noon on the day after your hospital discharge.

## 2022-05-24 NOTE — PROGRESS NOTE ADULT - ASSESSMENT
75 y/o female pmh htn is s/p Left THR    #S/P L THR  Post op orders per ortho  Pain management  Bowl regimen  Pt eval  labs reviewed  anemia likely from procedure, asymptomatic  Medically optimized for DC    #HTN  Continue home bp meds with hold parameters  Resume diuretics when discharged home.     #DVT proph  per ortho

## 2022-05-24 NOTE — PROGRESS NOTE ADULT - SUBJECTIVE AND OBJECTIVE BOX
Discharge medication calendar:  (ASA 81mg Qday preop)  Eliquis 2.5mg q12h x 35 days then resume ASA 81mg Qday  APAP 1000mg q8h x 2-3 weeks  Celecoxib 200mg q12h x 21 days  Omeprazole 20mg QAM x 6 weeks  Narcotic PRN  Docusate 100mg TID while taking narcotic  Miralax, Senna, or Bisacodyl PRN for treatment of constipation   Discharge medication calendar:  (ASA 81mg Qday preop)  Eliquis 2.5mg q12h x 35 days then resume ASA 81mg Qday  APAP 1000mg q8h x 2-3 weeks  Meloxicam 15mg Qday x 21 days  Omeprazole 20mg QAM x 6 weeks  Narcotic PRN  Docusate 100mg TID while taking narcotic  Miralax, Senna, or Bisacodyl PRN for treatment of constipation

## 2022-05-24 NOTE — DISCHARGE NOTE NURSING/CASE MANAGEMENT/SOCIAL WORK - NSDCPEFALRISK_GEN_ALL_CORE
For information on Fall & Injury Prevention, visit: [email or web address] OR  [email or web address] OR  https://www.cdc.gov/steadi/patient.html

## 2022-06-06 ENCOUNTER — APPOINTMENT (OUTPATIENT)
Dept: ORTHOPEDIC SURGERY | Facility: CLINIC | Age: 77
End: 2022-06-06
Payer: MEDICARE

## 2022-06-06 VITALS — SYSTOLIC BLOOD PRESSURE: 134 MMHG | DIASTOLIC BLOOD PRESSURE: 72 MMHG | HEART RATE: 74 BPM

## 2022-06-06 PROCEDURE — 99024 POSTOP FOLLOW-UP VISIT: CPT

## 2022-06-06 PROCEDURE — 73502 X-RAY EXAM HIP UNI 2-3 VIEWS: CPT | Mod: LT

## 2022-07-19 ENCOUNTER — APPOINTMENT (OUTPATIENT)
Dept: ORTHOPEDIC SURGERY | Facility: CLINIC | Age: 77
End: 2022-07-19

## 2022-07-19 VITALS — SYSTOLIC BLOOD PRESSURE: 130 MMHG | HEART RATE: 73 BPM | DIASTOLIC BLOOD PRESSURE: 67 MMHG

## 2022-07-19 DIAGNOSIS — Z96.642 PRESENCE OF LEFT ARTIFICIAL HIP JOINT: ICD-10-CM

## 2022-07-19 PROCEDURE — 99024 POSTOP FOLLOW-UP VISIT: CPT

## 2022-07-19 PROCEDURE — 73502 X-RAY EXAM HIP UNI 2-3 VIEWS: CPT | Mod: LT

## 2022-07-21 NOTE — PHYSICAL THERAPY INITIAL EVALUATION ADULT - ASSISTIVE DEVICE FOR TRANSFER: GAIT, REHAB EVAL
Assesment:  54 y.o M with no significant PMHx, recent COVID infection 3 weeks ago, who initially presented to [facility] ED on 7/19 with exertional chest pain X1 week, relieved with rest. In the ED patient noted to have elevated troponin and was ruled in for NSTEMI. He was given aspirin and brilinta and taken to the cath lab. Cath revealed 3VCAD: LAD 90%, D1 80%, LCx 90%, OM1 100% thrombotic occlusion with collaterals from RCA, RPDA 80%. IABP was placed and patient was brought to the ICU post cath. IABP was removed the next day and decision made to transfer to [facility] under the care of Dr. Bran for surgical evaluation.     Plan:  Problem 1: 3VCAD  - Patient transferred for surgical evaluation, pending pre op testing: labs, carotid U/S, TTE, bedside PFTs  - F/u P2Y12, per chart given brillinta at [facility]  - Continue heparin gtt     Problem 2:      Problem 3:      Problem 4:    I have reviewed clinical labs tests and reports, radiology tests and reports, as well as old patient medical records, and discussed with the refering physician.    
Assesment:  54 y.o M with no significant PMHx, recent COVID infection 3 weeks ago, who initially presented to [facility] ED on 7/19 with exertional chest pain X1 week, relieved with rest. In the ED patient noted to have elevated troponin and was ruled in for NSTEMI. He was given aspirin and brilinta and taken to the cath lab. Cath revealed 3VCAD: LAD 90%, D1 80%, LCx 90%, OM1 100% thrombotic occlusion with collaterals from RCA, RPDA 80%. IABP was placed and patient was brought to the ICU post cath. IABP was removed the next day and decision made to transfer to [facility] under the care of Dr. Bran for surgical evaluation.     Plan:  Problem 1: 3VCAD  - Patient transferred for surgical evaluation, pending pre op testing: labs, carotid U/S, TTE, bedside PFTs  - F/u P2Y12, per chart given brillinta at [facility]  - Continue heparin gtt, BB, atorvastatin, ASA     I have reviewed clinical labs tests and reports, radiology tests and reports, as well as old patient medical records, and discussed with the refering physician.    
rolling walker
rolling walker

## 2022-07-22 RX ORDER — CELECOXIB 200 MG/1
200 CAPSULE ORAL
Qty: 30 | Refills: 0 | Status: ACTIVE | COMMUNITY
Start: 2022-07-22 | End: 1900-01-01

## 2022-07-26 ENCOUNTER — APPOINTMENT (OUTPATIENT)
Dept: INTERNAL MEDICINE | Facility: CLINIC | Age: 77
End: 2022-07-26

## 2022-07-26 VITALS
SYSTOLIC BLOOD PRESSURE: 131 MMHG | RESPIRATION RATE: 14 BRPM | TEMPERATURE: 96.9 F | DIASTOLIC BLOOD PRESSURE: 68 MMHG | OXYGEN SATURATION: 95 % | BODY MASS INDEX: 25.16 KG/M2 | HEIGHT: 68 IN | WEIGHT: 166 LBS | HEART RATE: 87 BPM

## 2022-07-26 DIAGNOSIS — R39.15 URGENCY OF URINATION: ICD-10-CM

## 2022-07-26 PROCEDURE — 99214 OFFICE O/P EST MOD 30 MIN: CPT

## 2022-07-26 RX ORDER — AMOXICILLIN 500 MG/1
500 TABLET, FILM COATED ORAL
Qty: 8 | Refills: 4 | Status: DISCONTINUED | COMMUNITY
Start: 2022-06-06 | End: 2022-07-26

## 2022-08-03 LAB
APPEARANCE: ABNORMAL
BACTERIA: NEGATIVE
BILIRUBIN URINE: NEGATIVE
BLOOD URINE: ABNORMAL
CALCIUM OXALATE CRYSTALS: ABNORMAL
COLOR: YELLOW
GLUCOSE QUALITATIVE U: NEGATIVE
KETONES URINE: NEGATIVE
LEUKOCYTE ESTERASE URINE: NEGATIVE
MICROSCOPIC-UA: NORMAL
NITRITE URINE: NEGATIVE
PH URINE: 6
PROTEIN URINE: ABNORMAL
RED BLOOD CELLS URINE: 450 /HPF
SPECIFIC GRAVITY URINE: >=1.03
SQUAMOUS EPITHELIAL CELLS: 2 /HPF
URINE COMMENTS: NORMAL
UROBILINOGEN URINE: NORMAL
WHITE BLOOD CELLS URINE: 2 /HPF

## 2022-08-08 NOTE — HISTORY OF PRESENT ILLNESS
[de-identified] : 76 year old female accompanied by her sister with history of HLD, HTN, COPD,  Low back pain, SDH 9/2020 presents for evaluation of urinary frequency \par \par Patient reports having urinary frequency and urge but when she goes to the bathroom reports nothing really comes out. Denies any  foul smelling urine, vaginal discharge, fevers or chills \par Denies any CP, SOB, N/V or abdominal pain \par \par She has a follow-up appt with the surgeon on 2/28/2022

## 2022-08-08 NOTE — PHYSICAL EXAM
[No Acute Distress] : no acute distress [Well Nourished] : well nourished [Normal Sclera/Conjunctiva] : normal sclera/conjunctiva [EOMI] : extraocular movements intact [Normal Outer Ear/Nose] : the outer ears and nose were normal in appearance [Normal Oropharynx] : the oropharynx was normal [No JVD] : no jugular venous distention [No Lymphadenopathy] : no lymphadenopathy [Supple] : supple [Thyroid Normal, No Nodules] : the thyroid was normal and there were no nodules present [No Respiratory Distress] : no respiratory distress  [No Accessory Muscle Use] : no accessory muscle use [Clear to Auscultation] : lungs were clear to auscultation bilaterally [Normal Rate] : normal rate  [Regular Rhythm] : with a regular rhythm [Normal S1, S2] : normal S1 and S2 [No Murmur] : no murmur heard [Pedal Pulses Present] : the pedal pulses are present [No Edema] : there was no peripheral edema [No Extremity Clubbing/Cyanosis] : no extremity clubbing/cyanosis [Soft] : abdomen soft [Non Tender] : non-tender [Non-distended] : non-distended [Normal Bowel Sounds] : normal bowel sounds [Normal Posterior Cervical Nodes] : no posterior cervical lymphadenopathy [Normal Anterior Cervical Nodes] : no anterior cervical lymphadenopathy [No CVA Tenderness] : no CVA  tenderness [No Joint Swelling] : no joint swelling [Grossly Normal Strength/Tone] : grossly normal strength/tone [No Rash] : no rash [Coordination Grossly Intact] : coordination grossly intact [No Focal Deficits] : no focal deficits [Normal Affect] : the affect was normal [Normal Insight/Judgement] : insight and judgment were intact [de-identified] : ambulates with cane

## 2022-08-09 LAB
ALBUMIN SERPL ELPH-MCNC: 4 G/DL
ALP BLD-CCNC: 80 U/L
ALT SERPL-CCNC: 5 U/L
ANION GAP SERPL CALC-SCNC: 13 MMOL/L
AST SERPL-CCNC: 14 U/L
BILIRUB SERPL-MCNC: 0.4 MG/DL
BUN SERPL-MCNC: 12 MG/DL
CALCIUM SERPL-MCNC: 9.5 MG/DL
CHLORIDE SERPL-SCNC: 104 MMOL/L
CO2 SERPL-SCNC: 23 MMOL/L
CREAT SERPL-MCNC: 0.88 MG/DL
EGFR: 68 ML/MIN/1.73M2
GLUCOSE SERPL-MCNC: 102 MG/DL
POTASSIUM SERPL-SCNC: 4.1 MMOL/L
PROT SERPL-MCNC: 6.6 G/DL
SODIUM SERPL-SCNC: 140 MMOL/L
URINE CYTOLOGY: NORMAL

## 2022-08-15 ENCOUNTER — RX RENEWAL (OUTPATIENT)
Age: 77
End: 2022-08-15

## 2022-08-15 ENCOUNTER — APPOINTMENT (OUTPATIENT)
Dept: ULTRASOUND IMAGING | Facility: CLINIC | Age: 77
End: 2022-08-15

## 2022-08-15 ENCOUNTER — OUTPATIENT (OUTPATIENT)
Dept: OUTPATIENT SERVICES | Facility: HOSPITAL | Age: 77
LOS: 1 days | End: 2022-08-15
Payer: MEDICARE

## 2022-08-15 DIAGNOSIS — Z98.890 OTHER SPECIFIED POSTPROCEDURAL STATES: Chronic | ICD-10-CM

## 2022-08-15 DIAGNOSIS — Z00.00 ENCOUNTER FOR GENERAL ADULT MEDICAL EXAMINATION WITHOUT ABNORMAL FINDINGS: ICD-10-CM

## 2022-08-15 DIAGNOSIS — Z96.641 PRESENCE OF RIGHT ARTIFICIAL HIP JOINT: Chronic | ICD-10-CM

## 2022-08-15 DIAGNOSIS — Z00.8 ENCOUNTER FOR OTHER GENERAL EXAMINATION: ICD-10-CM

## 2022-08-15 PROCEDURE — 76770 US EXAM ABDO BACK WALL COMP: CPT | Mod: 26

## 2022-08-15 PROCEDURE — 76770 US EXAM ABDO BACK WALL COMP: CPT

## 2022-08-19 LAB — BACTERIA UR CULT: ABNORMAL

## 2022-09-12 ENCOUNTER — APPOINTMENT (OUTPATIENT)
Dept: UROLOGY | Facility: CLINIC | Age: 77
End: 2022-09-12

## 2022-09-12 VITALS
RESPIRATION RATE: 12 BRPM | OXYGEN SATURATION: 97 % | BODY MASS INDEX: 25.61 KG/M2 | DIASTOLIC BLOOD PRESSURE: 73 MMHG | TEMPERATURE: 98.2 F | HEIGHT: 68 IN | HEART RATE: 73 BPM | SYSTOLIC BLOOD PRESSURE: 122 MMHG | WEIGHT: 169 LBS

## 2022-09-12 PROCEDURE — 99204 OFFICE O/P NEW MOD 45 MIN: CPT

## 2022-09-12 NOTE — HISTORY OF PRESENT ILLNESS
[FreeTextEntry1] : 76 year old woman who presents for evaluation of microscopic hematuria found on urinalysis approximately 1 month ago to 450 red blood cells per high-powered field.  Urine cytology negative.  Urine culture demonstrated a urinary tract infection for which she was treated with antibiotics.. She reports no history of gross hematuria.  She does not believe that she had gross hematuria at this time.  No flank pain. No suprapubic pain. No dysuria.  No urinary frequency, urgency. No history of urinary tract infections or renal impairment. No aggravating or alleviating factors that she knows of contributing to hematuria.\par \par No family history of  malignancy.  No family history of nephrolithiasis.

## 2022-09-12 NOTE — ASSESSMENT
[FreeTextEntry1] : 76-year-old female with microscopic hematuria\par -urinalysis reviewed demonstrating 450 red blood cells per high-powered field, moderate calcium oxalate crystals; repeat\par -urine culture demonstrates a urinary tract infection, repeat given treatment with antibiotics\par -urine cytology negative\par -CT Urogram\par -cystoscopy.  Patient wishes to forego this at this time despite a recommendation to do so and the risks of urologic malignancy\par -We discussed AUA guidelines regarding risk stratification for microscopic hematuria\par -Extensive discussion of the potential etiologies of hematuria, as well as the need to complete full work up for evaluation of cancer or other  sources of hematuria.

## 2022-09-15 LAB
APPEARANCE: ABNORMAL
BACTERIA UR CULT: ABNORMAL
BACTERIA: NEGATIVE
BILIRUBIN URINE: NEGATIVE
BLOOD URINE: NEGATIVE
CALCIUM OXALATE CRYSTALS: ABNORMAL
COLOR: YELLOW
GLUCOSE QUALITATIVE U: NEGATIVE
HYALINE CASTS: 3 /LPF
KETONES URINE: NEGATIVE
LEUKOCYTE ESTERASE URINE: NEGATIVE
MICROSCOPIC-UA: NORMAL
NITRITE URINE: NEGATIVE
PH URINE: 6.5
PROTEIN URINE: ABNORMAL
RED BLOOD CELLS URINE: 1 /HPF
SPECIFIC GRAVITY URINE: 1.02
SQUAMOUS EPITHELIAL CELLS: 1 /HPF
URINE COMMENTS: NORMAL
UROBILINOGEN URINE: NORMAL
WHITE BLOOD CELLS URINE: 3 /HPF

## 2022-09-15 RX ORDER — CIPROFLOXACIN HYDROCHLORIDE 500 MG/1
500 TABLET, FILM COATED ORAL TWICE DAILY
Qty: 10 | Refills: 0 | Status: ACTIVE | COMMUNITY
Start: 2022-09-15 | End: 1900-01-01

## 2022-10-03 ENCOUNTER — APPOINTMENT (OUTPATIENT)
Dept: CT IMAGING | Facility: CLINIC | Age: 77
End: 2022-10-03

## 2022-10-03 ENCOUNTER — OUTPATIENT (OUTPATIENT)
Dept: OUTPATIENT SERVICES | Facility: HOSPITAL | Age: 77
LOS: 1 days | End: 2022-10-03
Payer: MEDICARE

## 2022-10-03 DIAGNOSIS — Z96.641 PRESENCE OF RIGHT ARTIFICIAL HIP JOINT: Chronic | ICD-10-CM

## 2022-10-03 DIAGNOSIS — Z00.8 ENCOUNTER FOR OTHER GENERAL EXAMINATION: ICD-10-CM

## 2022-10-03 DIAGNOSIS — Z98.890 OTHER SPECIFIED POSTPROCEDURAL STATES: Chronic | ICD-10-CM

## 2022-10-03 DIAGNOSIS — R31.29 OTHER MICROSCOPIC HEMATURIA: ICD-10-CM

## 2022-10-03 PROCEDURE — 74178 CT ABD&PLV WO CNTR FLWD CNTR: CPT | Mod: 26

## 2022-10-03 PROCEDURE — 74178 CT ABD&PLV WO CNTR FLWD CNTR: CPT

## 2022-10-10 ENCOUNTER — APPOINTMENT (OUTPATIENT)
Dept: UROLOGY | Facility: CLINIC | Age: 77
End: 2022-10-10

## 2022-10-10 VITALS
OXYGEN SATURATION: 98 % | RESPIRATION RATE: 14 BRPM | DIASTOLIC BLOOD PRESSURE: 61 MMHG | SYSTOLIC BLOOD PRESSURE: 115 MMHG | HEART RATE: 78 BPM | TEMPERATURE: 97.8 F | BODY MASS INDEX: 27.07 KG/M2 | WEIGHT: 178 LBS

## 2022-10-10 DIAGNOSIS — R31.29 OTHER MICROSCOPIC HEMATURIA: ICD-10-CM

## 2022-10-10 DIAGNOSIS — Q61.02 CONGENITAL MULTIPLE RENAL CYSTS: ICD-10-CM

## 2022-10-10 DIAGNOSIS — N20.0 CALCULUS OF KIDNEY: ICD-10-CM

## 2022-10-10 PROCEDURE — 99214 OFFICE O/P EST MOD 30 MIN: CPT

## 2022-10-10 NOTE — ASSESSMENT
[FreeTextEntry1] : 76-year-old woman who presents for follow-up of hematuria with new finding of bilateral renal stones, renal cysts, lung nodules, a grossly distended gallbladder with gallstones\par -CT images reviewed demonstrating multiple renal stones, renal cysts, gallstones with a distended gallbladder, lung nodules, splenomegaly\par -Incidental findings discussed on CT scan with the patient in detail\par -I recommended that she follow-up with a surgeon to discuss cholecystectomy, however she is refusing\par -I recommended that she follow-up with a pulmonologist regarding lung nodules however she is refusing\par -We discussed risks of untreated kidney stones\par -We discussed alternative etiologies of hematuria, including malignancy.  I recommended a cystoscopy, however she wishes to forego this\par -I recommended a repeat renal ultrasound in 6 months for surveillance of kidney stones and have ordered the exam, however she reports that she will likely not do this\par -Follow-up in 6 months

## 2022-10-10 NOTE — HISTORY OF PRESENT ILLNESS
[FreeTextEntry1] : 76-year-old woman who presents for follow-up of kidney stones, renal cysts, hematuria, multiple other findings on CT scan.  Patient recently underwent a CT urogram.  Hematuria and was found to have bilateral nonobstructing intrarenal stones measuring up to 8 mm, as well as a number of renal cysts.  Incidentally noted was a grossly distended gallbladder with multiple gallstones as well as a number of nodules in the lower lungs.  She denies gross hematuria.  No flank pain or suprapubic pain.  No nausea or vomiting..

## 2022-10-29 NOTE — H&P PST ADULT - FALL HARM RISK - PT AGE POPULATION HIDDEN
[facility]  Progress Note - Nury Eric 1969, 46 y o  female MRN: [id number]  Unit/Bed#: [facility] 2 -01 Encounter: [id number]  Primary Care Provider: Belen Benitez MD   Date and time admitted to hospital: 9/19/2022  8:04 AM    * Toxic metabolic encephalopathy  Assessment & Plan  Due to multiple metabolic derangements, home medications morphine and gabapentin in the setting of acute kidney injury  Overall from admission she has improved but is still confused  Patient has been evaluated by Neurology, Gerhard Leonard during prolonged hospital stay  MRI performed 10/14/2022 with development of severe periventricular and white matter hyperintensities without mass effect enhancement or hemorrhage correlation with toxic encephalopathy  Recommendation for 3 month follow-up to ensure resolution  Patient completed antibiotic course  Medically stable for discharge to inpatient rehab          Mild protein-calorie malnutrition ([facility] [address] )  Assessment & Plan  Malnutrition Findings:   Adult Malnutrition type: Acute illness  Adult Degree of Malnutrition: Malnutrition of moderate degree  Malnutrition Characteristics: Fat loss, Muscle loss, Inadequate energy, Weight loss                  360 Statement: Acute moderate pro, chelsey malnutrition d/t condition as evidence by mild muscle and fat loss at temples, orbitals, triceps, <75% energy intake > 7 days, 18 1% wt loss x 1 month, BMI 18 4, currently treated with liberalized oral diet, tried supplements, but PT refused, will continue to monitor for food preferences and additional snacks in between meals  BMI Findings:  Adult BMI Classifications: Underweight < 18 5        Body mass index is 18 4 kg/m²         Diarrhea  Assessment & Plan  Reports having multiple loose bowel movements  C diff negative  Continue florastor, Metamucil immodium prn    Pulmonary edema  Assessment & Plan  Developed flash pulmonary edema during hospital stay following aggressive IV resuscitation  Now resolved  Echocardiogram with preserved ejection fraction  Stable off diuretics    Bacteremia due to methicillin susceptible Staphylococcus aureus (MSSA)  Assessment & Plan  MSSA bacteremia, repeat cultures neg for greater than 72 hours  Echo with no vegetation  Appreciate recommendations from ID  Completed abx 10/26/22   Repeat blood cultures 2 week from completion of antibiotics    Traumatic rhabdomyolysis ([facility] [address] )  Assessment & Plan  · Secondary to fall, patient had been laying in bed for approximately 4 days  · She was subsequently brought to the hospital and found to be in acute rhabdomyolysis with ANUJA  · Had been on multiple sedating medications including MS Contin as well as gabapentin, which likely were contributing to sedation  · Now resolved    Transaminitis  Assessment & Plan  · Secondary to rhabdomyolysis  No evidence of liver injury on CT imaging  · Seen by GI  Hepatic duplex also negative  ARF (acute renal failure) (HCC)  Assessment & Plan  · ANUJA/ATN secondary to rhabdomyolysis  No evidence of renal obstruction on imaging    Kidney function has returned to normal      DDD (degenerative disc disease), lumbosacral  Assessment & Plan  · Lumbar radiculopathy with previously scheduled surgery now on hold  Discussed case with patient's outpatient neurosurgeon  Initial surgery was canceled prior to hospitalization  It was decided that patient will need a much larger procedure with multilevel fusion and scoliosis correction  They discussed that surgery would be high risk given prolonged anesthesia time and comorbidities      · Prior to admission was on gabapentin and morphine IR 15 mg  · Patient reporting some lower extremity neuropathy today  · Increase gabapentin back to home dose 300 mg q 6 hours        Tobacco abuse  Assessment & Plan  · Nicotine patch ordered    Depression  Assessment & Plan  · Does have history of suicide attempt a currently does not show any signs of thoughts of self-harm  · Psychiatry consultation appreciated  · Continue current medications      VTE Pharmacologic Prophylaxis:  Heparin    Patient Centered Rounds:  Patient care rounds were performed with nursing    Time Spent for Care: 30  More than 50% of total time spent on counseling and coordination of care as described above  Current Length of Stay: 40 day(s)    Current Patient Status: Inpatient   Certification Statement: The patient will continue to require additional inpatient hospital stay due to awaiting placement    Discharge Plan:  Awaiting placement    Code Status: Level 1 - Full Code      Subjective:   Patient seen evaluated at bedside  Nursing reports that she had some neuropathic pain in her feet today  Objective:     Vitals:   Temp (24hrs), Av 4 °F (36 9 °C), Min:97 9 °F (36 6 °C), Max:99 1 °F (37 3 °C)    Temp:  [97 9 °F (36 6 °C)-99 1 °F (37 3 °C)] 99 1 °F (37 3 °C)  HR:  [] 105  Resp:  [18] 18  BP: (116-126)/(80-87) 126/87  SpO2:  [97 %-99 %] 98 %  Body mass index is 18 4 kg/m²  Input and Output Summary (last 24 hours): Intake/Output Summary (Last 24 hours) at 10/29/2022 1300  Last data filed at 10/29/2022 0801  Gross per 24 hour   Intake 350 ml   Output --   Net 350 ml       Physical Exam:     Physical Exam  Vitals reviewed  Constitutional:       General: She is not in acute distress  Appearance: She is well-developed  She is not ill-appearing, toxic-appearing or diaphoretic  HENT:      Head: Normocephalic and atraumatic  Mouth/Throat:      Mouth: Mucous membranes are moist    Eyes:      General: No scleral icterus  Extraocular Movements: Extraocular movements intact  Cardiovascular:      Rate and Rhythm: Normal rate and regular rhythm  Heart sounds: Normal heart sounds  Pulmonary:      Effort: Pulmonary effort is normal  No respiratory distress  Breath sounds: Normal breath sounds  No wheezing or rales     Abdominal: General: There is no distension  Palpations: Abdomen is soft  Tenderness: There is no abdominal tenderness  There is no guarding or rebound  Musculoskeletal:         General: No swelling, tenderness or deformity  Skin:     General: Skin is warm and dry  Neurological:      General: No focal deficit present  Mental Status: She is alert  She is disoriented  Additional Data:     Labs:  I have reviewed pertinent results     Results from last 7 days   Lab Units 10/26/22  1049 10/24/22  0530   WBC Thousand/uL 6 37 8 87   HEMOGLOBIN g/dL 10 5* 11 7   HEMATOCRIT % 33 8* 37 8   PLATELETS Thousands/uL 229 310   NEUTROS PCT %  --  66   LYMPHS PCT %  --  26   MONOS PCT %  --  5   EOS PCT %  --  2     Results from last 7 days   Lab Units 10/26/22  1049   SODIUM mmol/L 140   POTASSIUM mmol/L 3 5   CHLORIDE mmol/L 104   CO2 mmol/L 25   BUN mg/dL 12   CREATININE mg/dL 0 75   ANION GAP mmol/L 11   CALCIUM mg/dL 9 1   GLUCOSE RANDOM mg/dL 138                         Imaging: I have reviewed pertinent imaging       Recent Cultures (last 7 days):           Last 24 Hours Medication List:   Current Facility-Administered Medications   Medication Dose Route Frequency Provider Last Rate   • acetaminophen  650 mg Oral Q6H PRN Steve Miranda DO     • ALPRAZolam  0 5 mg Oral HS PRN Jordan Wilkes MD     • amLODIPine  10 mg Oral Daily Selena Grover MD     • gabapentin  300 mg Oral 4x Daily Lopez Duenas DO     • heparin (porcine)  5,000 Units Subcutaneous [facility] Atul Adan, DO     • levalbuterol  1 25 mg Nebulization Q4H PRN Sam Litten, DO     • loperamide  2 mg Oral 4x Daily PRN Jordan Wilkes MD     • metoprolol tartrate  25 mg Oral Q12H [address] Inder Owens MD     • VANDANA ANTIFUNGAL   Topical BID Martita Muckle Leiva Nose, DO     • nicotine  1 patch Transdermal Daily Atul Adan DO     • OLANZapine  5 mg Oral Q6H PRN Sam Litten, DO     • ondansetron  4 mg Intravenous Q4H PRN Steve Miranda,      • psyllium  1 packet Oral Daily Balbir Estes DO     • QUEtiapine  50 mg Oral BID Renetta Stone MD     • saccharomyces boulardii  250 mg Oral BID Renetta Stone MD     • venlafaxine  187 5 mg Oral Daily Alida Peres DO          Today, Patient Was Seen By: Balbir Estes    ** Please Note: Dictation voice to text software may have been used in the creation of this document   ** Adult

## 2022-12-21 ENCOUNTER — APPOINTMENT (OUTPATIENT)
Dept: INTERNAL MEDICINE | Facility: CLINIC | Age: 77
End: 2022-12-21

## 2022-12-21 VITALS
RESPIRATION RATE: 12 BRPM | WEIGHT: 170 LBS | OXYGEN SATURATION: 97 % | HEIGHT: 68 IN | DIASTOLIC BLOOD PRESSURE: 77 MMHG | SYSTOLIC BLOOD PRESSURE: 134 MMHG | HEART RATE: 78 BPM | TEMPERATURE: 97.3 F | BODY MASS INDEX: 25.76 KG/M2

## 2022-12-21 DIAGNOSIS — N64.4 MASTODYNIA: ICD-10-CM

## 2022-12-21 DIAGNOSIS — R41.3 OTHER AMNESIA: ICD-10-CM

## 2022-12-21 PROCEDURE — 99214 OFFICE O/P EST MOD 30 MIN: CPT

## 2022-12-21 RX ORDER — CEPHALEXIN 500 MG/1
500 CAPSULE ORAL TWICE DAILY
Qty: 10 | Refills: 0 | Status: DISCONTINUED | COMMUNITY
Start: 2022-07-26 | End: 2022-12-21

## 2022-12-21 NOTE — HISTORY OF PRESENT ILLNESS
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 states had hip replacement surgery on her left hip in May 2022  She is c/o worsening of left hip pain for few weeks . Denies recent fall/ trauma \par \par Pt. expressed that  since she had  head trauma  she has had memory impairment that makes it difficult for her to remember things that happen; both related to her long term and short term memory. When asked pt. is unable to express when she had the fall that caused the memory loss. \par \par Pt. states she often feels tired to the extent that at times she feels like she "wants to fall asleep while walking", expressed that she has never fallen asleep while walking or driving. \par \par She c/o a pain in her left breast, denies any lumps , nipple discharge \par  \par Denies any CP, SOB, N/V/D or abdominal pain, lightheadedness, or dizziness.  \par Not compliant with  cholesterol pill intake \par

## 2022-12-21 NOTE — PHYSICAL EXAM
[No Acute Distress] : no acute distress [Well Nourished] : well nourished [Normal Sclera/Conjunctiva] : normal sclera/conjunctiva [EOMI] : extraocular movements intact [Normal Outer Ear/Nose] : the outer ears and nose were normal in appearance [Normal Oropharynx] : the oropharynx was normal [No JVD] : no jugular venous distention [No Lymphadenopathy] : no lymphadenopathy [Supple] : supple [Thyroid Normal, No Nodules] : the thyroid was normal and there were no nodules present [No Respiratory Distress] : no respiratory distress  [No Accessory Muscle Use] : no accessory muscle use [Clear to Auscultation] : lungs were clear to auscultation bilaterally [Normal Rate] : normal rate  [Regular Rhythm] : with a regular rhythm [Normal S1, S2] : normal S1 and S2 [No Murmur] : no murmur heard [Pedal Pulses Present] : the pedal pulses are present [No Edema] : there was no peripheral edema [No Extremity Clubbing/Cyanosis] : no extremity clubbing/cyanosis [No Masses] : no palpable masses [No Nipple Discharge] : no nipple discharge [No Axillary Lymphadenopathy] : no axillary lymphadenopathy [Soft] : abdomen soft [Non Tender] : non-tender [Non-distended] : non-distended [Normal Bowel Sounds] : normal bowel sounds [Normal Posterior Cervical Nodes] : no posterior cervical lymphadenopathy [Normal Anterior Cervical Nodes] : no anterior cervical lymphadenopathy [No CVA Tenderness] : no CVA  tenderness [No Joint Swelling] : no joint swelling [Grossly Normal Strength/Tone] : grossly normal strength/tone [No Rash] : no rash [No Focal Deficits] : no focal deficits [Normal Affect] : the affect was normal [Normal Insight/Judgement] : insight and judgment were intact [de-identified] : Back brace in place [de-identified] : Left hip tenderness with range of motion [de-identified] : ambulates with cane

## 2022-12-21 NOTE — REVIEW OF SYSTEMS
[Fatigue] : fatigue [Headache] : no headache [Dizziness] : dizziness [Memory Loss] : memory loss [Negative] : Heme/Lymph [FreeTextEntry9] : left hip pain  [FreeTextEntry1] : left breast pain

## 2023-07-31 ENCOUNTER — RX RENEWAL (OUTPATIENT)
Age: 78
End: 2023-07-31

## 2023-08-09 ENCOUNTER — APPOINTMENT (OUTPATIENT)
Dept: INTERNAL MEDICINE | Facility: CLINIC | Age: 78
End: 2023-08-09
Payer: MEDICARE

## 2023-08-09 VITALS
HEART RATE: 75 BPM | TEMPERATURE: 97.5 F | BODY MASS INDEX: 27.89 KG/M2 | WEIGHT: 184 LBS | HEIGHT: 68 IN | SYSTOLIC BLOOD PRESSURE: 130 MMHG | DIASTOLIC BLOOD PRESSURE: 75 MMHG | OXYGEN SATURATION: 94 % | RESPIRATION RATE: 12 BRPM

## 2023-08-09 DIAGNOSIS — I47.1 SUPRAVENTRICULAR TACHYCARDIA: ICD-10-CM

## 2023-08-09 PROCEDURE — 99214 OFFICE O/P EST MOD 30 MIN: CPT

## 2023-08-14 LAB
ALBUMIN SERPL ELPH-MCNC: 4.3 G/DL
ALP BLD-CCNC: 69 U/L
ALT SERPL-CCNC: 8 U/L
ANION GAP SERPL CALC-SCNC: 11 MMOL/L
AST SERPL-CCNC: 14 U/L
BILIRUB SERPL-MCNC: 0.6 MG/DL
BUN SERPL-MCNC: 10 MG/DL
CALCIUM SERPL-MCNC: 10.1 MG/DL
CHLORIDE SERPL-SCNC: 104 MMOL/L
CHOLEST SERPL-MCNC: 212 MG/DL
CO2 SERPL-SCNC: 27 MMOL/L
CREAT SERPL-MCNC: 0.99 MG/DL
EGFR: 58 ML/MIN/1.73M2
ESTIMATED AVERAGE GLUCOSE: 108 MG/DL
GLUCOSE SERPL-MCNC: 94 MG/DL
HBA1C MFR BLD HPLC: 5.4 %
HDLC SERPL-MCNC: 52 MG/DL
LDLC SERPL CALC-MCNC: 140 MG/DL
NONHDLC SERPL-MCNC: 161 MG/DL
POTASSIUM SERPL-SCNC: 3.9 MMOL/L
PROT SERPL-MCNC: 7 G/DL
SODIUM SERPL-SCNC: 141 MMOL/L
TRIGL SERPL-MCNC: 115 MG/DL
TSH SERPL-ACNC: 1.71 UIU/ML

## 2023-09-01 PROBLEM — I47.1 SVT (SUPRAVENTRICULAR TACHYCARDIA): Status: ACTIVE | Noted: 2021-10-13

## 2023-09-01 NOTE — HEALTH RISK ASSESSMENT
[Former] : Former [> 15 Years] : > 15 Years [3] : 2) Feeling down, depressed, or hopeless for nearly every day (3) [PHQ-2 Positive] : PHQ-2 Positive [Nearly Every Day (3)] : 2.) Feeling down, depressed or hopeless? Nearly every day [Not at All (0)] : 8.) Moving or speaking so slowly that other people could have noticed, or the opposite, moving or speaking faster than usual? Not at all [Mild] : severity of depression is mild [Not at all] : How difficult have these problems made it for you to do your work, take care of things at home, or get along with people? Not at all [[id number]] : 6 [[id number]] : 9

## 2023-09-01 NOTE — HISTORY OF PRESENT ILLNESS
[de-identified] : 77 year old female with h/o Htn/ Hld/ COPD/ Low back pain/ SDH presents for follow up on chronic problems and meds renewal  Pt. states she is feeling well. Pt. states she has never seen a psychiatrist. States she went to a dermatologist a week ago, and did three skin biopsies on her forehead, pt. is still awaiting results of the biopsy. Pt. states the MD said it is possibly skin cancer.  States she gets tired during the day, States she feel two years ago and was told that she may have had a minor CVA.  Denies CP, SOB, N/V/D, abdominal pain, HA, lightheadedness, or dizziness.

## 2023-09-01 NOTE — PHYSICAL EXAM
[No Acute Distress] : no acute distress [Well Nourished] : well nourished [Well Developed] : well developed [Well-Appearing] : well-appearing [Normal Sclera/Conjunctiva] : normal sclera/conjunctiva [PERRL] : pupils equal round and reactive to light [EOMI] : extraocular movements intact [Normal Outer Ear/Nose] : the outer ears and nose were normal in appearance [Normal Oropharynx] : the oropharynx was normal [Normal TMs] : both tympanic membranes were normal [No JVD] : no jugular venous distention [No Lymphadenopathy] : no lymphadenopathy [Supple] : supple [No Respiratory Distress] : no respiratory distress  [No Accessory Muscle Use] : no accessory muscle use [Clear to Auscultation] : lungs were clear to auscultation bilaterally [Normal Rate] : normal rate  [Regular Rhythm] : with a regular rhythm [Normal S1, S2] : normal S1 and S2 [No Murmur] : no murmur heard [Pedal Pulses Present] : the pedal pulses are present [No Edema] : there was no peripheral edema [Soft] : abdomen soft [Non Tender] : non-tender [Non-distended] : non-distended [No Masses] : no abdominal mass palpated [Normal Bowel Sounds] : normal bowel sounds [Normal Posterior Cervical Nodes] : no posterior cervical lymphadenopathy [Normal Anterior Cervical Nodes] : no anterior cervical lymphadenopathy [No Spinal Tenderness] : no spinal tenderness [No Joint Swelling] : no joint swelling [Grossly Normal Strength/Tone] : grossly normal strength/tone [No Rash] : no rash [No Focal Deficits] : no focal deficits [Normal Gait] : normal gait [Deep Tendon Reflexes (DTR)] : deep tendon reflexes were 2+ and symmetric [Normal Affect] : the affect was normal [Normal Insight/Judgement] : insight and judgment were intact

## 2023-11-22 ENCOUNTER — APPOINTMENT (OUTPATIENT)
Dept: CARDIOLOGY | Facility: CLINIC | Age: 78
End: 2023-11-22

## 2023-11-22 ENCOUNTER — APPOINTMENT (OUTPATIENT)
Dept: INTERNAL MEDICINE | Facility: CLINIC | Age: 78
End: 2023-11-22

## 2024-02-16 ENCOUNTER — LABORATORY RESULT (OUTPATIENT)
Age: 79
End: 2024-02-16

## 2024-02-16 ENCOUNTER — APPOINTMENT (OUTPATIENT)
Dept: INTERNAL MEDICINE | Facility: CLINIC | Age: 79
End: 2024-02-16
Payer: MEDICARE

## 2024-02-16 VITALS
SYSTOLIC BLOOD PRESSURE: 122 MMHG | WEIGHT: 180 LBS | BODY MASS INDEX: 27.28 KG/M2 | DIASTOLIC BLOOD PRESSURE: 64 MMHG | HEIGHT: 68 IN | RESPIRATION RATE: 12 BRPM | HEART RATE: 73 BPM | OXYGEN SATURATION: 97 % | TEMPERATURE: 97.5 F

## 2024-02-16 DIAGNOSIS — E78.5 HYPERLIPIDEMIA, UNSPECIFIED: ICD-10-CM

## 2024-02-16 DIAGNOSIS — Z00.00 ENCOUNTER FOR GENERAL ADULT MEDICAL EXAMINATION W/OUT ABNORMAL FINDINGS: ICD-10-CM

## 2024-02-16 DIAGNOSIS — I10 ESSENTIAL (PRIMARY) HYPERTENSION: ICD-10-CM

## 2024-02-16 DIAGNOSIS — Z13.820 ENCOUNTER FOR SCREENING FOR OSTEOPOROSIS: ICD-10-CM

## 2024-02-16 PROCEDURE — G0444 DEPRESSION SCREEN ANNUAL: CPT | Mod: 59

## 2024-02-16 PROCEDURE — 99397 PER PM REEVAL EST PAT 65+ YR: CPT

## 2024-02-16 RX ORDER — METOPROLOL SUCCINATE 25 MG/1
25 TABLET, EXTENDED RELEASE ORAL
Qty: 90 | Refills: 1 | Status: ACTIVE | COMMUNITY
Start: 2021-10-13 | End: 1900-01-01

## 2024-02-18 PROBLEM — E78.5 HYPERLIPIDEMIA: Status: ACTIVE | Noted: 2018-05-07

## 2024-02-18 NOTE — PHYSICAL EXAM
[No Acute Distress] : no acute distress [Well Nourished] : well nourished [Well Developed] : well developed [Well-Appearing] : well-appearing [Normal Sclera/Conjunctiva] : normal sclera/conjunctiva [PERRL] : pupils equal round and reactive to light [EOMI] : extraocular movements intact [Normal Outer Ear/Nose] : the outer ears and nose were normal in appearance [Normal Oropharynx] : the oropharynx was normal [Normal TMs] : both tympanic membranes were normal [No JVD] : no jugular venous distention [No Lymphadenopathy] : no lymphadenopathy [Supple] : supple [No Respiratory Distress] : no respiratory distress  [No Accessory Muscle Use] : no accessory muscle use [Clear to Auscultation] : lungs were clear to auscultation bilaterally [Normal Rate] : normal rate  [Regular Rhythm] : with a regular rhythm [Normal S1, S2] : normal S1 and S2 [No Murmur] : no murmur heard [Pedal Pulses Present] : the pedal pulses are present [No Edema] : there was no peripheral edema [Soft] : abdomen soft [Non Tender] : non-tender [Non-distended] : non-distended [No Masses] : no abdominal mass palpated [Normal Bowel Sounds] : normal bowel sounds [Normal Posterior Cervical Nodes] : no posterior cervical lymphadenopathy [Normal Anterior Cervical Nodes] : no anterior cervical lymphadenopathy [No Spinal Tenderness] : no spinal tenderness [No Joint Swelling] : no joint swelling [Grossly Normal Strength/Tone] : grossly normal strength/tone [No Rash] : no rash [No Focal Deficits] : no focal deficits [Deep Tendon Reflexes (DTR)] : deep tendon reflexes were 2+ and symmetric [Normal Affect] : the affect was normal [Normal Insight/Judgement] : insight and judgment were intact [de-identified] : skin lesion forehead  [de-identified] : +ambulates with a cane

## 2024-02-18 NOTE — PLAN
[FreeTextEntry1] : 1. see plan  2. Htn/ Hld cont current meds  need low fat diet/ exercise/ repeat fasting lipids in 1 month cardiology referral  3. h/o stroke neuro follow up  Pt declined pneumonia and shingles vaccines pt declined psych referral

## 2024-02-18 NOTE — HEALTH RISK ASSESSMENT
[Good] : ~his/her~  mood as  good [No] : No [No falls in past year] : Patient reported no falls in the past year [1] : 1) Little interest or pleasure doing things for several days (1) [0] : 2) Feeling down, depressed, or hopeless: Not at all (0) [PHQ-2 Negative - No further assessment needed] : PHQ-2 Negative - No further assessment needed [Alone] : lives alone [High School] : high school [Single] : single [Feels Safe at Home] : Feels safe at home [Fully functional (bathing, dressing, toileting, transferring, walking, feeding)] : Fully functional (bathing, dressing, toileting, transferring, walking, feeding) [Fully functional (using the telephone, shopping, preparing meals, housekeeping, doing laundry, using] : Fully functional and needs no help or supervision to perform IADLs (using the telephone, shopping, preparing meals, housekeeping, doing laundry, using transportation, managing medications and managing finances) [Smoke Detector] : smoke detector [Carbon Monoxide Detector] : carbon monoxide detector [Seat Belt] :  uses seat belt [Former] : Former [0-4] : 0-4 [> 15 Years] : > 15 Years [Reports changes in vision] : Reports no changes in vision [Reports changes in dental health] : Reports no changes in dental health [Guns at Home] : no guns at home [Sunscreen] : does not use sunscreen [Travel to Developing Areas] : does not  travel to developing areas [TB Exposure] : is not being exposed to tuberculosis [Caregiver Concerns] : does not have caregiver concerns [BoneDensityDate] : 1/2022 [MammogramDate] : 2019 [de-identified] : last eye exam 2023

## 2024-02-18 NOTE — REVIEW OF SYSTEMS
[Fatigue] : fatigue [Itching] : Itching [Negative] : Heme/Lymph [de-identified] : Dry skin throughout body

## 2024-02-18 NOTE — HISTORY OF PRESENT ILLNESS
[de-identified] : DAVID Blum is a 78 year old female accompanied by her sister with history of HLD, HTN, COPD, Low back pain, SDH 9/2020 presents for an annual physical exam.  Patient states that she is feeling well and is doing fine. Pt is c/o constant itchiness throughout her body due to dry skin. Dry skin has worsened due to the cold weather. She has been following up with a dermatologist, and also recently had surgery for skin cancer (unspecified) removal on her forehead and on Lt cheek. Incisions have since healed well and are not infected. Denies bleeding in the area, numbness, or localized pain.   Pt was a former smoker (quit in her 20s). Denies drinking alcohol. She reports that she occasionally feels depressed and anxious since she doesnt leave her house often. She is also c/o constant fatigue. She claims that she is managing well on her own and does not wish to see a psychiatrist. Has not been following up with a neurologist. Denies any CP, SOB, N/V/D or abdominal pain, lightheadedness, or dizziness.

## 2024-02-18 NOTE — END OF VISIT
[FreeTextEntry3] :    Documented by Santos Beltre acting as a scribe under Dr. Berkowitz. 02/16/2024

## 2024-03-01 LAB
25(OH)D3 SERPL-MCNC: 35.9 NG/ML
ALBUMIN SERPL ELPH-MCNC: 4.5 G/DL
ALP BLD-CCNC: 69 U/L
ALT SERPL-CCNC: 8 U/L
ANION GAP SERPL CALC-SCNC: 11 MMOL/L
APPEARANCE: ABNORMAL
AST SERPL-CCNC: 14 U/L
BASOPHILS # BLD AUTO: 0.05 K/UL
BASOPHILS NFR BLD AUTO: 0.8 %
BILIRUB SERPL-MCNC: 0.6 MG/DL
BILIRUBIN URINE: NEGATIVE
BLOOD URINE: NEGATIVE
BUN SERPL-MCNC: 16 MG/DL
CALCIUM SERPL-MCNC: 9.7 MG/DL
CHLORIDE SERPL-SCNC: 105 MMOL/L
CO2 SERPL-SCNC: 28 MMOL/L
COLOR: YELLOW
CREAT SERPL-MCNC: 0.98 MG/DL
EGFR: 59 ML/MIN/1.73M2
EOSINOPHIL # BLD AUTO: 0.07 K/UL
EOSINOPHIL NFR BLD AUTO: 1.1 %
ESTIMATED AVERAGE GLUCOSE: 108 MG/DL
GLUCOSE QUALITATIVE U: NEGATIVE MG/DL
GLUCOSE SERPL-MCNC: 101 MG/DL
HBA1C MFR BLD HPLC: 5.4 %
HCT VFR BLD CALC: 43.3 %
HGB BLD-MCNC: 13.9 G/DL
IMM GRANULOCYTES NFR BLD AUTO: 0.2 %
KETONES URINE: NEGATIVE MG/DL
LEUKOCYTE ESTERASE URINE: ABNORMAL
LYMPHOCYTES # BLD AUTO: 2.45 K/UL
LYMPHOCYTES NFR BLD AUTO: 38.5 %
MAN DIFF?: NORMAL
MCHC RBC-ENTMCNC: 28.7 PG
MCHC RBC-ENTMCNC: 32.1 GM/DL
MCV RBC AUTO: 89.3 FL
MONOCYTES # BLD AUTO: 0.48 K/UL
MONOCYTES NFR BLD AUTO: 7.5 %
NEUTROPHILS # BLD AUTO: 3.3 K/UL
NEUTROPHILS NFR BLD AUTO: 51.9 %
NITRITE URINE: NEGATIVE
PH URINE: 6
PLATELET # BLD AUTO: 196 K/UL
POTASSIUM SERPL-SCNC: 4.2 MMOL/L
PROT SERPL-MCNC: 6.7 G/DL
PROTEIN URINE: 30 MG/DL
RBC # BLD: 4.85 M/UL
RBC # FLD: 13.9 %
SODIUM SERPL-SCNC: 144 MMOL/L
SPECIFIC GRAVITY URINE: 1.02
TSH SERPL-ACNC: 1.82 UIU/ML
UROBILINOGEN URINE: 1 MG/DL
VIT B12 SERPL-MCNC: 273 PG/ML
WBC # FLD AUTO: 6.36 K/UL

## 2024-03-02 LAB
CHOLEST SERPL-MCNC: 206 MG/DL
HDLC SERPL-MCNC: 53 MG/DL
LDLC SERPL CALC-MCNC: 132 MG/DL
NONHDLC SERPL-MCNC: 153 MG/DL
TRIGL SERPL-MCNC: 117 MG/DL

## 2024-03-13 ENCOUNTER — APPOINTMENT (OUTPATIENT)
Dept: CARDIOLOGY | Facility: CLINIC | Age: 79
End: 2024-03-13

## 2024-03-20 NOTE — ED ADULT NURSE NOTE - NS ED NOTE  TALK SOMEONE YN
"Procedure: EGD    Diagnosis: GERD, esophageal dysphagia    Procedure Timing: 3-4 weeks    *If within 4 weeks selected, please christiana as high priority*    *If greater than 12 weeks, please select "5-12 weeks" and delay sending until 3 months prior to requested date*    Provider: Any GI provider    Location: No Preference    Additional Scheduling Information:  Have requested urgent cardiac clearance    Prep Specifications:Gastroparesis (Extended clear liquid)    Is the patient taking a GLP-1 Agonist:no but did stop her GLP-1 agonist 3 weeks ago.    Have you attached a patient to this message: yes   "
No

## 2024-08-16 ENCOUNTER — APPOINTMENT (OUTPATIENT)
Dept: INTERNAL MEDICINE | Facility: CLINIC | Age: 79
End: 2024-08-16
Payer: MEDICARE

## 2024-08-16 DIAGNOSIS — E78.5 HYPERLIPIDEMIA, UNSPECIFIED: ICD-10-CM

## 2024-08-16 DIAGNOSIS — I10 ESSENTIAL (PRIMARY) HYPERTENSION: ICD-10-CM

## 2024-08-16 PROCEDURE — 99213 OFFICE O/P EST LOW 20 MIN: CPT

## 2024-08-16 NOTE — PLAN
[FreeTextEntry1] : Follow up  HTN cont amlodipine 5mg daily cont metoprolol 25mg daily  Reinforced the importance of following a low sodium diet/increased physical activity.  HLD cont atorvastatin 10mg daily  Reinforced the importance of following a low cholesterol/low fat diet and increased physical activity.   proteinuria we'll repeat UA  All meds renewed.  follow up in 3 months for repeat labs

## 2024-08-16 NOTE — HISTORY OF PRESENT ILLNESS
[Home] : at home, [unfilled] , at the time of the visit. [Medical Office: ([facility])___] : at the medical office located in  [Verbal consent obtained from patient] : the patient, [unfilled] [de-identified] : Ms. DAVID HARRISON is a 78 year old female with Hx of HLD, HTN, COPD, stroke, low back pain, s/p SDH 9/2020, seen in telemedicine for a follow up visit and Rx refills.  Pt declines bloodwork at this time. Pt states she is feeling well. Denies any SOB, CP, abdominal pain, N/V/D, headache, dizziness, or leg swelling. Reports compliance with taking her meds daily.

## 2024-08-16 NOTE — END OF VISIT
[FreeTextEntry3] : Documented by Kaelyn Morgan acting as a scribe for Dr. Berkowitz. 08/16/2024   All medical record entries made by the scribe were at my, Dr. Berkowitz, direction and personally dictated by me on 08/16/2024. I have reviewed the chart and agree that the record accurately reflects my personal performance of the history, physical exam, assessment and plan. I have also personally directed, reviewed, and agreed with the chart.

## 2024-08-16 NOTE — HISTORY OF PRESENT ILLNESS
[Home] : at home, [unfilled] , at the time of the visit. [Medical Office: ([facility])___] : at the medical office located in  [Verbal consent obtained from patient] : the patient, [unfilled] [de-identified] : Ms. DAVID HARRISON is a 78 year old female with Hx of HLD, HTN, COPD, stroke, low back pain, s/p SDH 9/2020, seen in telemedicine for a follow up visit and Rx refills.  Pt declines bloodwork at this time. Pt states she is feeling well. Denies any SOB, CP, abdominal pain, N/V/D, headache, dizziness, or leg swelling. Reports compliance with taking her meds daily.

## 2024-10-01 LAB
CHOLEST SERPL-MCNC: 147 MG/DL
HDLC SERPL-MCNC: 57 MG/DL
LDLC SERPL CALC-MCNC: 73 MG/DL
NONHDLC SERPL-MCNC: 91 MG/DL
TRIGL SERPL-MCNC: 93 MG/DL

## 2025-02-18 ENCOUNTER — APPOINTMENT (OUTPATIENT)
Dept: INTERNAL MEDICINE | Facility: CLINIC | Age: 80
End: 2025-02-18
Payer: MEDICARE

## 2025-02-18 ENCOUNTER — LABORATORY RESULT (OUTPATIENT)
Age: 80
End: 2025-02-18

## 2025-02-18 VITALS
BODY MASS INDEX: 28.64 KG/M2 | HEART RATE: 66 BPM | OXYGEN SATURATION: 95 % | WEIGHT: 189 LBS | RESPIRATION RATE: 14 BRPM | TEMPERATURE: 97.7 F | HEIGHT: 68 IN

## 2025-02-18 VITALS — SYSTOLIC BLOOD PRESSURE: 130 MMHG | DIASTOLIC BLOOD PRESSURE: 65 MMHG

## 2025-02-18 DIAGNOSIS — Z13.820 ENCOUNTER FOR SCREENING FOR OSTEOPOROSIS: ICD-10-CM

## 2025-02-18 DIAGNOSIS — F32.A ANXIETY DISORDER, UNSPECIFIED: ICD-10-CM

## 2025-02-18 DIAGNOSIS — Z12.39 ENCOUNTER FOR OTHER SCREENING FOR MALIGNANT NEOPLASM OF BREAST: ICD-10-CM

## 2025-02-18 DIAGNOSIS — F41.9 ANXIETY DISORDER, UNSPECIFIED: ICD-10-CM

## 2025-02-18 DIAGNOSIS — Z00.00 ENCOUNTER FOR GENERAL ADULT MEDICAL EXAMINATION W/OUT ABNORMAL FINDINGS: ICD-10-CM

## 2025-02-18 PROCEDURE — G0439: CPT

## 2025-02-18 PROCEDURE — G0444 DEPRESSION SCREEN ANNUAL: CPT

## 2025-02-18 RX ORDER — ESCITALOPRAM OXALATE 5 MG/1
5 TABLET ORAL
Qty: 1 | Refills: 1 | Status: ACTIVE | COMMUNITY
Start: 2025-02-18 | End: 1900-01-01

## 2025-02-27 LAB
25(OH)D3 SERPL-MCNC: 36.1 NG/ML
ALBUMIN SERPL ELPH-MCNC: 4 G/DL
ALP BLD-CCNC: 82 U/L
ALT SERPL-CCNC: 11 U/L
ANION GAP SERPL CALC-SCNC: 11 MMOL/L
APPEARANCE: ABNORMAL
AST SERPL-CCNC: 18 U/L
BASOPHILS # BLD AUTO: 0.04 K/UL
BASOPHILS NFR BLD AUTO: 0.6 %
BILIRUB SERPL-MCNC: 0.8 MG/DL
BILIRUBIN URINE: NEGATIVE
BLOOD URINE: NEGATIVE
BUN SERPL-MCNC: 12 MG/DL
CALCIUM SERPL-MCNC: 9.5 MG/DL
CHLORIDE SERPL-SCNC: 107 MMOL/L
CHOLEST SERPL-MCNC: 137 MG/DL
CO2 SERPL-SCNC: 26 MMOL/L
COLOR: YELLOW
CREAT SERPL-MCNC: 0.96 MG/DL
EGFR: 60 ML/MIN/1.73M2
EOSINOPHIL # BLD AUTO: 0.07 K/UL
EOSINOPHIL NFR BLD AUTO: 1 %
ESTIMATED AVERAGE GLUCOSE: 108 MG/DL
GLUCOSE QUALITATIVE U: NEGATIVE MG/DL
GLUCOSE SERPL-MCNC: 93 MG/DL
HBA1C MFR BLD HPLC: 5.4 %
HCT VFR BLD CALC: 42.5 %
HDLC SERPL-MCNC: 57 MG/DL
HGB BLD-MCNC: 13.6 G/DL
IMM GRANULOCYTES NFR BLD AUTO: 0.3 %
KETONES URINE: NEGATIVE MG/DL
LDLC SERPL CALC-MCNC: 62 MG/DL
LEUKOCYTE ESTERASE URINE: NEGATIVE
LYMPHOCYTES # BLD AUTO: 2.18 K/UL
LYMPHOCYTES NFR BLD AUTO: 30.7 %
MAN DIFF?: NORMAL
MCHC RBC-ENTMCNC: 29.3 PG
MCHC RBC-ENTMCNC: 32 G/DL
MCV RBC AUTO: 91.6 FL
MONOCYTES # BLD AUTO: 0.55 K/UL
MONOCYTES NFR BLD AUTO: 7.7 %
NEUTROPHILS # BLD AUTO: 4.24 K/UL
NEUTROPHILS NFR BLD AUTO: 59.7 %
NITRITE URINE: NEGATIVE
NONHDLC SERPL-MCNC: 80 MG/DL
PH URINE: 6.5
PLATELET # BLD AUTO: 188 K/UL
POTASSIUM SERPL-SCNC: 4.4 MMOL/L
PROT SERPL-MCNC: 6.7 G/DL
PROTEIN URINE: NORMAL MG/DL
RBC # BLD: 4.64 M/UL
RBC # FLD: 13.5 %
SODIUM SERPL-SCNC: 144 MMOL/L
SPECIFIC GRAVITY URINE: 1.02
TRIGL SERPL-MCNC: 99 MG/DL
TSH SERPL-ACNC: 1.59 UIU/ML
UROBILINOGEN URINE: 0.2 MG/DL
VIT B12 SERPL-MCNC: 395 PG/ML
WBC # FLD AUTO: 7.1 K/UL

## 2025-03-17 NOTE — PHYSICAL THERAPY INITIAL EVALUATION ADULT - PLANNED THERAPY INTERVENTIONS, PT EVAL
bed mobility training/gait training/transfer training
To get better and follow your care plan as instructed.
bed mobility training/gait training/transfer training
